# Patient Record
Sex: FEMALE | Race: WHITE | NOT HISPANIC OR LATINO | Employment: UNEMPLOYED | URBAN - METROPOLITAN AREA
[De-identification: names, ages, dates, MRNs, and addresses within clinical notes are randomized per-mention and may not be internally consistent; named-entity substitution may affect disease eponyms.]

---

## 2018-01-09 ENCOUNTER — HOSPITAL ENCOUNTER (EMERGENCY)
Facility: CLINIC | Age: 38
Discharge: HOME | End: 2018-01-09
Attending: EMERGENCY MEDICINE | Admitting: EMERGENCY MEDICINE
Payer: MEDICAID

## 2018-01-09 ENCOUNTER — APPOINTMENT (OUTPATIENT)
Dept: ULTRASOUND IMAGING | Facility: CLINIC | Age: 38
End: 2018-01-09
Attending: EMERGENCY MEDICINE
Payer: MEDICAID

## 2018-01-09 ENCOUNTER — APPOINTMENT (OUTPATIENT)
Dept: CT IMAGING | Facility: CLINIC | Age: 38
End: 2018-01-09
Attending: EMERGENCY MEDICINE
Payer: MEDICAID

## 2018-01-09 VITALS
OXYGEN SATURATION: 100 % | DIASTOLIC BLOOD PRESSURE: 99 MMHG | SYSTOLIC BLOOD PRESSURE: 139 MMHG | WEIGHT: 181.44 LBS | RESPIRATION RATE: 18 BRPM | BODY MASS INDEX: 29.51 KG/M2 | TEMPERATURE: 97 F

## 2018-01-09 DIAGNOSIS — R79.89 ELEVATED LFTS: ICD-10-CM

## 2018-01-09 DIAGNOSIS — R10.84 ABDOMINAL PAIN, GENERALIZED: ICD-10-CM

## 2018-01-09 LAB
ALBUMIN SERPL-MCNC: 3.7 G/DL (ref 3.4–5)
ALBUMIN UR-MCNC: NEGATIVE MG/DL
ALP SERPL-CCNC: 74 U/L (ref 40–150)
ALT SERPL W P-5'-P-CCNC: 147 U/L (ref 0–50)
ANION GAP SERPL CALCULATED.3IONS-SCNC: 7 MMOL/L (ref 3–14)
APPEARANCE UR: ABNORMAL
AST SERPL W P-5'-P-CCNC: 161 U/L (ref 0–45)
BASOPHILS # BLD AUTO: 0 10E9/L (ref 0–0.2)
BASOPHILS NFR BLD AUTO: 0.5 %
BILIRUB SERPL-MCNC: 0.3 MG/DL (ref 0.2–1.3)
BILIRUB UR QL STRIP: NEGATIVE
BUN SERPL-MCNC: 11 MG/DL (ref 7–30)
CALCIUM SERPL-MCNC: 8.5 MG/DL (ref 8.5–10.1)
CHLORIDE SERPL-SCNC: 104 MMOL/L (ref 94–109)
CO2 SERPL-SCNC: 27 MMOL/L (ref 20–32)
COLOR UR AUTO: YELLOW
CREAT SERPL-MCNC: 0.78 MG/DL (ref 0.52–1.04)
DIFFERENTIAL METHOD BLD: ABNORMAL
EOSINOPHIL # BLD AUTO: 0.1 10E9/L (ref 0–0.7)
EOSINOPHIL NFR BLD AUTO: 1.6 %
ERYTHROCYTE [DISTWIDTH] IN BLOOD BY AUTOMATED COUNT: 15.6 % (ref 10–15)
GFR SERPL CREATININE-BSD FRML MDRD: 84 ML/MIN/1.7M2
GLUCOSE SERPL-MCNC: 84 MG/DL (ref 70–99)
GLUCOSE UR STRIP-MCNC: NEGATIVE MG/DL
HCG UR QL: NEGATIVE
HCT VFR BLD AUTO: 46.4 % (ref 35–47)
HGB BLD-MCNC: 14.8 G/DL (ref 11.7–15.7)
HGB UR QL STRIP: NEGATIVE
IMM GRANULOCYTES # BLD: 0 10E9/L (ref 0–0.4)
IMM GRANULOCYTES NFR BLD: 0.2 %
KETONES UR STRIP-MCNC: NEGATIVE MG/DL
LEUKOCYTE ESTERASE UR QL STRIP: NEGATIVE
LIPASE SERPL-CCNC: 169 U/L (ref 73–393)
LYMPHOCYTES # BLD AUTO: 1 10E9/L (ref 0.8–5.3)
LYMPHOCYTES NFR BLD AUTO: 17.2 %
MCH RBC QN AUTO: 28.2 PG (ref 26.5–33)
MCHC RBC AUTO-ENTMCNC: 31.9 G/DL (ref 31.5–36.5)
MCV RBC AUTO: 88 FL (ref 78–100)
MONOCYTES # BLD AUTO: 0.5 10E9/L (ref 0–1.3)
MONOCYTES NFR BLD AUTO: 8.9 %
MUCOUS THREADS #/AREA URNS LPF: PRESENT /LPF
NEUTROPHILS # BLD AUTO: 4.1 10E9/L (ref 1.6–8.3)
NEUTROPHILS NFR BLD AUTO: 71.6 %
NITRATE UR QL: NEGATIVE
NRBC # BLD AUTO: 0 10*3/UL
NRBC BLD AUTO-RTO: 0 /100
PH UR STRIP: 5 PH (ref 5–7)
PLATELET # BLD AUTO: 357 10E9/L (ref 150–450)
POTASSIUM SERPL-SCNC: 3.9 MMOL/L (ref 3.4–5.3)
PROT SERPL-MCNC: 7.6 G/DL (ref 6.8–8.8)
RBC # BLD AUTO: 5.25 10E12/L (ref 3.8–5.2)
RBC #/AREA URNS AUTO: 3 /HPF (ref 0–2)
SODIUM SERPL-SCNC: 138 MMOL/L (ref 133–144)
SOURCE: ABNORMAL
SP GR UR STRIP: 1.02 (ref 1–1.03)
SQUAMOUS #/AREA URNS AUTO: 4 /HPF (ref 0–1)
UROBILINOGEN UR STRIP-MCNC: 2 MG/DL (ref 0–2)
WBC # BLD AUTO: 5.7 10E9/L (ref 4–11)
WBC #/AREA URNS AUTO: 3 /HPF (ref 0–2)

## 2018-01-09 PROCEDURE — 85025 COMPLETE CBC W/AUTO DIFF WBC: CPT | Performed by: EMERGENCY MEDICINE

## 2018-01-09 PROCEDURE — 99285 EMERGENCY DEPT VISIT HI MDM: CPT | Mod: 25

## 2018-01-09 PROCEDURE — 96361 HYDRATE IV INFUSION ADD-ON: CPT

## 2018-01-09 PROCEDURE — 80053 COMPREHEN METABOLIC PANEL: CPT | Performed by: EMERGENCY MEDICINE

## 2018-01-09 PROCEDURE — 96375 TX/PRO/DX INJ NEW DRUG ADDON: CPT

## 2018-01-09 PROCEDURE — 81025 URINE PREGNANCY TEST: CPT | Performed by: EMERGENCY MEDICINE

## 2018-01-09 PROCEDURE — 81001 URINALYSIS AUTO W/SCOPE: CPT | Performed by: EMERGENCY MEDICINE

## 2018-01-09 PROCEDURE — 74177 CT ABD & PELVIS W/CONTRAST: CPT

## 2018-01-09 PROCEDURE — 96374 THER/PROPH/DIAG INJ IV PUSH: CPT | Mod: 59

## 2018-01-09 PROCEDURE — 83690 ASSAY OF LIPASE: CPT | Performed by: EMERGENCY MEDICINE

## 2018-01-09 PROCEDURE — 76705 ECHO EXAM OF ABDOMEN: CPT

## 2018-01-09 PROCEDURE — 25000128 H RX IP 250 OP 636: Performed by: EMERGENCY MEDICINE

## 2018-01-09 PROCEDURE — 96376 TX/PRO/DX INJ SAME DRUG ADON: CPT

## 2018-01-09 RX ORDER — IOPAMIDOL 755 MG/ML
500 INJECTION, SOLUTION INTRAVASCULAR ONCE
Status: COMPLETED | OUTPATIENT
Start: 2018-01-09 | End: 2018-01-09

## 2018-01-09 RX ORDER — OXYCODONE HYDROCHLORIDE 5 MG/1
5 TABLET ORAL EVERY 6 HOURS PRN
Qty: 12 TABLET | Refills: 0 | Status: SHIPPED | OUTPATIENT
Start: 2018-01-09 | End: 2018-10-22

## 2018-01-09 RX ORDER — HYDROMORPHONE HYDROCHLORIDE 1 MG/ML
0.5 INJECTION, SOLUTION INTRAMUSCULAR; INTRAVENOUS; SUBCUTANEOUS ONCE
Status: COMPLETED | OUTPATIENT
Start: 2018-01-09 | End: 2018-01-09

## 2018-01-09 RX ORDER — ONDANSETRON 4 MG/1
4 TABLET, ORALLY DISINTEGRATING ORAL EVERY 8 HOURS PRN
Qty: 10 TABLET | Refills: 0 | Status: SHIPPED | OUTPATIENT
Start: 2018-01-09 | End: 2018-01-12

## 2018-01-09 RX ORDER — ONDANSETRON 2 MG/ML
4 INJECTION INTRAMUSCULAR; INTRAVENOUS ONCE
Status: COMPLETED | OUTPATIENT
Start: 2018-01-09 | End: 2018-01-09

## 2018-01-09 RX ADMIN — Medication 0.5 MG: at 04:30

## 2018-01-09 RX ADMIN — Medication 0.5 MG: at 06:07

## 2018-01-09 RX ADMIN — SODIUM CHLORIDE 63 ML: 9 INJECTION, SOLUTION INTRAVENOUS at 05:19

## 2018-01-09 RX ADMIN — SODIUM CHLORIDE 1000 ML: 9 INJECTION, SOLUTION INTRAVENOUS at 03:36

## 2018-01-09 RX ADMIN — ONDANSETRON 4 MG: 2 INJECTION INTRAMUSCULAR; INTRAVENOUS at 03:35

## 2018-01-09 RX ADMIN — IOPAMIDOL 91 ML: 755 INJECTION, SOLUTION INTRAVENOUS at 05:19

## 2018-01-09 RX ADMIN — Medication 0.5 MG: at 03:35

## 2018-01-09 ASSESSMENT — ENCOUNTER SYMPTOMS
DYSURIA: 0
DIARRHEA: 0
FREQUENCY: 0
ABDOMINAL PAIN: 1
HEMATURIA: 0
CONSTIPATION: 0
FEVER: 0
VOMITING: 1

## 2018-01-09 NOTE — ED PROVIDER NOTES
History     Chief Complaint:  Abdominal pain    HPI   Catarino James is a 37-year-old female who presents for evaluation of constant diffuse abdominal pain.  The patient reports that she woke up yesterday morning at 0830 with this pain.  She had one episode of vomiting after eating a piece of pizza and has not eaten anything since.  The patient has had no fever and has not noticed any aggravating or alleviating factors for her pain.  She states that she has had normal bowel and bladder function.  The patient reports that she recently took a pregnancy test which was negative.  She states that her last normal menstrual period was in November; however, she states that she did start her period 3 days ago and then did not have it and then had again one day ago.  The patient denies any current vaginal bleeding or discharge.    Allergies:  No known drug allergies.      Medications:     Levothyroxine   Wellbutrin   Celexa   Adderall     Past Medical History:    ADD   Anemia   Depression   Hypothyroidism     Past Surgical History:         Family History:    History reviewed. No pertinent family history.     Social History:  Marital Status: Single  Presents to the ED with significant other   Tobacco Use: Never  Alcohol Use: No   PCP: Katalina Muñoz      Review of Systems   Constitutional: Negative for fever.   Gastrointestinal: Positive for abdominal pain and vomiting (x1). Negative for constipation and diarrhea.   Genitourinary: Negative for dysuria, frequency, hematuria and urgency.   All other systems reviewed and are negative.    Physical Exam   First Vitals:  BP: (!) 148/109  Heart Rate: 94  Temp: 97  F (36.1  C)  Resp: 20  Weight: 82.3 kg (181 lb 7 oz)  SpO2: 99 %    Physical Exam  Constitutional: The patient is oriented to person, place, and time. Alert and cooperative.  HENT:   Right Ear: External ear normal.   Left Ear: External ear normal.   Nose: Nose normal.   Mouth/Throat: Uvula is midline,  oropharynx is clear and moist and mucous membranes are normal. No posterior oropharyngeal edema or erythema.   Eyes: Conjunctivae, EOM and lids are normal. Pupils are equal, round, and reactive to light.   Neck: Trachea normal. Normal range of motion. Neck supple.   Cardiovascular: Normal rate, regular rhythm, normal heart sounds, and intact distal pulses.    Pulmonary/Chest: Effort normal and breath sounds equal bilaterally. No crackles or wheezing.   Abdominal: Soft. Mild diffuse tenderness to palpation. No rebound and no guarding.   Musculoskeletal: Normal range of motion.  No extremity tenderness or edema.  Neurological: Alert and Oriented. Strength 5/5 in upper and lower extremities bilaterally. Sensation intact to light touch throughout.  Skin: Skin is dry. No rash noted.          Emergency Department Course   Imaging:  CT Abdomen Pelvis w Contrast  1. No convincing cause of acute pain identified in the abdomen or  pelvis.  2. A 0.6 cm appendicolith is present in the distal appendix, but the  appendix is otherwise unremarkable and there is therefore no  convincing evidence of acute appendicitis.  Report per radiology: Rodrigo Donahue MD (01/09/18 06:05:04)    Abdomen US, Limited (RUQ)  1. Unremarkable appearance of the gallbladder.  2. No biliary dilatation.  3. Mild diffuse fatty infiltration of the liver.  Report per radiology: Rodrigo Donahue MD (01/09/18 06:56:51)    Radiographic findings were communicated with the patient who voiced understanding of the findings.    Laboratory:  Blood:  CMP: , , otherwise WNL (Creatinine 0.78)   CBC:  WBC 5.7, HGB 14.8,    Lipase: 169    Urine:  UA: Slightly cloudy yellow urine, squamous epithelials 4, WBC 3, RBC 3, Mucous present, otherwise WNL   HCG Qualitative Pregnancy: Negative.     Interventions:  (0335) Zofran, 4 mg, IV injection   (0335) Dilaudid, 0.5 mg, IV injection   (0336) Normal Saline, 1 liter, IV bolus   (0430) Dilaudid,  0.5 mg, IV injection     Emergency Department Course:  Nursing notes and vitals reviewed.    (0319) I entered the room with my scribe, obtained the history, and performed an exam of the patient as documented above.    A peripheral IV was established. Blood was drawn from the patient. This was sent for laboratory testing, findings above.      Urine sample was obtained and sent for laboratory analysis, findings above.     The patient received the interventions above.     The patient was sent for a CT of the abdomen and pelvis as well as an abdominal ultrasound while in the emergency department, findings above.      Findings and plan explained to the Patient. Patient discharged home with instructions regarding supportive care, medications, and reasons to return. The importance of close follow-up was reviewed.        Impression & Plan      Medical Decision Making:  Catarino James is a 37-year-old female who presents for evaluation of constant diffuse abdominal pain.  Upon presentation in the ED, the patient is nontoxic-appearing and vitals are within normal limits and stable.  On exam, she is well-appearing.  She is alert, oriented, and neurologic exam is nonfocal.  Cardiopulmonary exam is unremarkable.  On abdominal examination, she does endorse mild diffuse tenderness with palpation.  There is no rebound or guarding.  The rest of her exam is as mentioned above.    Labs were obtained and are as mentioned above.  Notably, she does not have a leukocytosis.  LFTs are mildly elevated.  Therefore, a right upper quadrant ultrasound was obtained and demonstrates mild diffuse fatty infiltration of the liver, but the gallbladder is unremarkable.  There is no biliary dilatation.  Given that the patient continues to endorse significant pain, CT of the abdomen was obtained and demonstrates no acute abnormality.  Per radiology, there is a 0.6 cm appendicolith in the distal appendix, but the appendix is otherwise unremarkable and  there is no convincing evidence to suggest an acute appendicitis.  These results were discussed with the patient and she notes understanding.  Upon my repeat evaluation, the patient does note improvement in her symptoms.  Overall, the patient is well-appearing with a relatively unremarkable workup here.  Therefore, I do feel she is safe for discharge to home.  I did discuss with the patient, that I recommend close follow-up with her primary care physician for follow up and close monitoring of her LFTs. She notes understanding and agreement with this plan.  Return instructions were given.  She was stable/improved at the time of discharge.    Diagnosis:    ICD-10-CM    1. Abdominal pain, generalized R10.84 Lipase   2. Elevated LFTs R79.89        Disposition:  discharged to home    [facility] EMERGENCY DEPARTMENT      Scribe disclosure:  GREYSON, Liam Whittaker, am serving as a scribe on 1/9/2018 at 3:19 AM to personally document services performed by Bertha Sosa MD based on my observations and the provider's statements to me.                Bertha Sosa MD  01/12/18 0043

## 2018-01-09 NOTE — DISCHARGE INSTRUCTIONS
Please follow up closely with your regular physician.     Please return to the ED if your symptoms worsen or if you develop new or concerning symptoms.     Discharge Instructions  Abdominal Pain    Abdominal pain can be caused by many things. Your evaluation today does not show the exact cause for your pain. Your doctor today has decided that it is unlikely your pain is due to a life threatening problem, or a problem requiring surgery or hospital admission. Sometimes those problems cannot be found right away, so it is very important that you follow up as directed.  Sometimes only the changes which occur over time allow the cause of your pain to be found.    Return to the Emergency Department for a recheck in 8-12 hours if your pain continues.  If your pain gets worse, changes in location, or feels different, return to the Emergency Department right away.    ADULTS:  Return to the Emergency Department right away if:      You get an oral temperature above 102oF or as directed by your doctor.    You have blood in your stools (bright red or black, tarry stools).    You keep throwing up or can t drink liquids.    You see blood when you throw up.    You can t have a bowel movement or you can t pass gas.    Your stomach gets bloated or bigger.    Your skin or the whites of your eyes look yellow.    You faint.    You have bloody, frequent or painful urination.    You have new symptoms or anything that worries you.    CHILDREN:  Return to the Emergency Department right away if your child has any of the above-listed symptoms or the following:      Pushes your hand away or screams/cries when his/her belly is touched.    You notice your child is very fussy or weak.    Your child is very tired and is too tired to eat or drink.    Your child is dehydrated.  Signs of dehydration can be:  o Your infant has had no wet diapers in 4-5 hours.  o Your older child has not passed urine in 6-8 hours.  o Your infant or child starts to have  dry mouth and lips, or no saliva or tears.    PREGNANT WOMEN:  Return to the Emergency Department right away if you have any of the above-listed symptoms or the following:      You have bleeding, leaking fluid or passing tissue from the vagina.    You have worse pain or cramping, or pain in your shoulder or back.    You have vomiting that will not stop.    You have painful or bloody urination.    You have a temperature of 100oF or more.    Your baby is not moving as much as usual.    You faint.    You get a bad headache with or without eye problems and abdominal pain.    You have a convulsion or seizure.    You have unusual discharge from your vagina and abdominal pain.    Abdominal pain is pretty common during pregnancy.  Your pain may or may not be related to your pregnancy. You should follow-up closely with your OB doctor so they can evaluate you and your baby.  Until you follow-up with your regular doctor, do the following:       Avoid sex and do not put anything in your vagina.    Drink clear fluids.    Only take medications approved by your doctor.    MORE INFORMATION:    Appendicitis:  A possible cause of abdominal pain in any person who still has their appendix is acute appendicitis. Appendicitis is often hard to diagnose.  Testing does not always rule out early appendicitis or other causes of abdominal pain. Close follow-up with your doctor and re-evaluations may be needed to figure out the reason for your abdominal pain.    Follow-up:  It is very important that you make an appointment with your clinic and go to the appointment.  If you do not follow-up with your primary doctor, it may result in missing an important development which could result in permanent injury or disability and/or lasting pain.  If there is any problem keeping your appointment, call your doctor or return to the Emergency Department.    Medications:  Take your medications as directed by your doctor today.  Before using over-the-counter  "medications, ask your doctor and make sure to take the medications as directed.  If you have any questions about medications, ask your doctor.    Diet:  Resume your normal diet as much as possible, but do not eat fried, fatty or spicy foods while you have pain.  Do not drink alcohol or have caffeine.  Do not smoke tobacco.    Probiotics: If you have been given an antibiotic, you may want to also take a probiotic pill or eat yogurt with live cultures. Probiotics have \"good bacteria\" to help your intestines stay healthy. Studies have shown that probiotics help prevent diarrhea and other intestine problems (including C. diff infection) when you take antibiotics. You can buy these without a prescription in the pharmacy section of the store.     If you were given a prescription for medicine here today, be sure to read all of the information (including the package insert) that comes with your prescription.  This will include important information about the medicine, its side effects, and any warnings that you need to know about.  The pharmacist who fills the prescription can provide more information and answer questions you may have about the medicine.  If you have questions or concerns that the pharmacist cannot address, please call or return to the Emergency Department.         Opioid Medication Information    Pain medications are among the most commonly prescribed medicines, so we are including this information for all our patients. If you did not receive pain medication or get a prescription for pain medicine, you can ignore it.     You may have been given a prescription for an opioid (narcotic) pain medicine and/or have received a pain medicine while here in the Emergency Department. These medicines can make you drowsy or impaired. You must not drive, operate dangerous equipment, or engage in any other dangerous activities while taking these medications. If you drive while taking these medications, you could be " arrested for DUI, or driving under the influence. Do not drink any alcohol while you are taking these medications.     Opioid pain medications can cause addiction. If you have a history of chemical dependency of any type, you are at a higher risk of becoming addicted to pain medications.  Only take these prescribed medications to treat your pain when all other options have been tried. Take it for as short a time and as few doses as possible. Store your pain pills in a secure place, as they are frequently stolen and provide a dangerous opportunity for children or visitors in your house to start abusing these powerful medications. We will not replace any lost or stolen medicine.  As soon as your pain is better, you should flush all your remaining medication.     Many prescription pain medications contain Tylenol  (acetaminophen), including Vicodin , Tylenol #3 , Norco , Lortab , and Percocet .  You should not take any extra pills of Tylenol  if you are using these prescription medications or you can get very sick.  Do not ever take more than 3000 mg of acetaminophen in any 24 hour period.    All opioids tend to cause constipation. Drink plenty of water and eat foods that have a lot of fiber, such as fruits, vegetables, prune juice, apple juice and high fiber cereal.  Take a laxative if you don t move your bowels at least every other day. Miralax , Milk of Magnesia, Colace , or Senna  can be used to keep you regular.      Remember that you can always come back to the Emergency Department if you are not able to see your regular doctor in the amount of time listed above, if you get any new symptoms, or if there is anything that worries you.

## 2018-01-09 NOTE — ED AVS SNAPSHOT
[facility] Emergency Department    [address] [place] [address]    Phone:  [phone]    Fax:  [phone]                                       Catarino James   MRN: [id number]    Department:  [facility] Emergency Department   Date of Visit:  1/9/2018           Patient Information     Date Of Birth          1980        Your diagnoses for this visit were:     Abdominal pain, generalized     Elevated LFTs        You were seen by Bertha Sosa MD.      Follow-up Information     Follow up with Katalina Muñoz MD In 3 days.    Specialty:  Family Practice    Contact information:    [facility] [place]  [address] [place] [address]  [phone]          Discharge Instructions       Please follow up closely with your regular physician.     Please return to the ED if your symptoms worsen or if you develop new or concerning symptoms.     Discharge Instructions  Abdominal Pain    Abdominal pain can be caused by many things. Your evaluation today does not show the exact cause for your pain. Your doctor today has decided that it is unlikely your pain is due to a life threatening problem, or a problem requiring surgery or hospital admission. Sometimes those problems cannot be found right away, so it is very important that you follow up as directed.  Sometimes only the changes which occur over time allow the cause of your pain to be found.    Return to the Emergency Department for a recheck in 8-12 hours if your pain continues.  If your pain gets worse, changes in location, or feels different, return to the Emergency Department right away.    ADULTS:  Return to the Emergency Department right away if:      You get an oral temperature above 102oF or as directed by your doctor.    You have blood in your stools (bright red or black, tarry stools).    You keep throwing up or can t drink liquids.    You see blood when you throw up.    You can t have a  bowel movement or you can t pass gas.    Your stomach gets bloated or bigger.    Your skin or the whites of your eyes look yellow.    You faint.    You have bloody, frequent or painful urination.    You have new symptoms or anything that worries you.    CHILDREN:  Return to the Emergency Department right away if your child has any of the above-listed symptoms or the following:      Pushes your hand away or screams/cries when his/her belly is touched.    You notice your child is very fussy or weak.    Your child is very tired and is too tired to eat or drink.    Your child is dehydrated.  Signs of dehydration can be:  o Your infant has had no wet diapers in 4-5 hours.  o Your older child has not passed urine in 6-8 hours.  o Your infant or child starts to have dry mouth and lips, or no saliva or tears.    PREGNANT WOMEN:  Return to the Emergency Department right away if you have any of the above-listed symptoms or the following:      You have bleeding, leaking fluid or passing tissue from the vagina.    You have worse pain or cramping, or pain in your shoulder or back.    You have vomiting that will not stop.    You have painful or bloody urination.    You have a temperature of 100oF or more.    Your baby is not moving as much as usual.    You faint.    You get a bad headache with or without eye problems and abdominal pain.    You have a convulsion or seizure.    You have unusual discharge from your vagina and abdominal pain.    Abdominal pain is pretty common during pregnancy.  Your pain may or may not be related to your pregnancy. You should follow-up closely with your OB doctor so they can evaluate you and your baby.  Until you follow-up with your regular doctor, do the following:       Avoid sex and do not put anything in your vagina.    Drink clear fluids.    Only take medications approved by your doctor.    MORE INFORMATION:    Appendicitis:  A possible cause of abdominal pain in any person who still has their  "appendix is acute appendicitis. Appendicitis is often hard to diagnose.  Testing does not always rule out early appendicitis or other causes of abdominal pain. Close follow-up with your doctor and re-evaluations may be needed to figure out the reason for your abdominal pain.    Follow-up:  It is very important that you make an appointment with your clinic and go to the appointment.  If you do not follow-up with your primary doctor, it may result in missing an important development which could result in permanent injury or disability and/or lasting pain.  If there is any problem keeping your appointment, call your doctor or return to the Emergency Department.    Medications:  Take your medications as directed by your doctor today.  Before using over-the-counter medications, ask your doctor and make sure to take the medications as directed.  If you have any questions about medications, ask your doctor.    Diet:  Resume your normal diet as much as possible, but do not eat fried, fatty or spicy foods while you have pain.  Do not drink alcohol or have caffeine.  Do not smoke tobacco.    Probiotics: If you have been given an antibiotic, you may want to also take a probiotic pill or eat yogurt with live cultures. Probiotics have \"good bacteria\" to help your intestines stay healthy. Studies have shown that probiotics help prevent diarrhea and other intestine problems (including C. diff infection) when you take antibiotics. You can buy these without a prescription in the pharmacy section of the store.     If you were given a prescription for medicine here today, be sure to read all of the information (including the package insert) that comes with your prescription.  This will include important information about the medicine, its side effects, and any warnings that you need to know about.  The pharmacist who fills the prescription can provide more information and answer questions you may have about the medicine.  If you have " questions or concerns that the pharmacist cannot address, please call or return to the Emergency Department.         Opioid Medication Information    Pain medications are among the most commonly prescribed medicines, so we are including this information for all our patients. If you did not receive pain medication or get a prescription for pain medicine, you can ignore it.     You may have been given a prescription for an opioid (narcotic) pain medicine and/or have received a pain medicine while here in the Emergency Department. These medicines can make you drowsy or impaired. You must not drive, operate dangerous equipment, or engage in any other dangerous activities while taking these medications. If you drive while taking these medications, you could be arrested for DUI, or driving under the influence. Do not drink any alcohol while you are taking these medications.     Opioid pain medications can cause addiction. If you have a history of chemical dependency of any type, you are at a higher risk of becoming addicted to pain medications.  Only take these prescribed medications to treat your pain when all other options have been tried. Take it for as short a time and as few doses as possible. Store your pain pills in a secure place, as they are frequently stolen and provide a dangerous opportunity for children or visitors in your house to start abusing these powerful medications. We will not replace any lost or stolen medicine.  As soon as your pain is better, you should flush all your remaining medication.     Many prescription pain medications contain Tylenol  (acetaminophen), including Vicodin , Tylenol #3 , Norco , Lortab , and Percocet .  You should not take any extra pills of Tylenol  if you are using these prescription medications or you can get very sick.  Do not ever take more than 3000 mg of acetaminophen in any 24 hour period.    All opioids tend to cause constipation. Drink plenty of water and eat foods  that have a lot of fiber, such as fruits, vegetables, prune juice, apple juice and high fiber cereal.  Take a laxative if you don t move your bowels at least every other day. Miralax , Milk of Magnesia, Colace , or Senna  can be used to keep you regular.      Remember that you can always come back to the Emergency Department if you are not able to see your regular doctor in the amount of time listed above, if you get any new symptoms, or if there is anything that worries you.          24 Hour Appointment Hotline       To make an appointment at any [facility], call [phone] ([phone]). If you don't have a family doctor or clinic, we will help you find one. [facility] clinics are conveniently located to serve the needs of you and your family.             Review of your medicines      START taking        Dose / Directions Last dose taken    ondansetron 4 MG ODT tab   Commonly known as:  ZOFRAN ODT   Dose:  4 mg   Quantity:  10 tablet        Take 1 tablet (4 mg) by mouth every 8 hours as needed   Refills:  0        oxyCODONE IR 5 MG tablet   Commonly known as:  ROXICODONE   Dose:  5 mg   Quantity:  12 tablet        Take 1 tablet (5 mg) by mouth every 6 hours as needed for pain   Refills:  0          Our records show that you are taking the medicines listed below. If these are incorrect, please call your family doctor or clinic.        Dose / Directions Last dose taken    ADDERALL 20 MG per tablet   Quantity:  90   Generic drug:  amphetamine-dextroamphetamine        1 tablet po tid   Refills:  0        albuterol 108 (90 BASE) MCG/ACT Inhaler   Commonly known as:  PROAIR HFA/PROVENTIL HFA/VENTOLIN HFA   Dose:  2 puff   Quantity:  1 Inhaler        Inhale 2 puffs into the lungs every 4 hours as needed (PRN cough)   Refills:  0        celeXA 40 MG tablet   Quantity:  90   Generic drug:  citalopram        1 TABLET DAILY   Refills:  1        LEVOTHROID PO        Refills:  0        WELLBUTRIN PO        Refills:   0                Prescriptions were sent or printed at these locations (2 Prescriptions)                   Other Prescriptions                Printed at Department/Unit printer (2 of 2)         oxyCODONE IR (ROXICODONE) 5 MG tablet               ondansetron (ZOFRAN ODT) 4 MG ODT tab                Procedures and tests performed during your visit     Abdomen US, limited (RUQ only)    CBC + differential    CT Abdomen Pelvis w Contrast    Comprehensive metabolic panel    HCG qualitative urine    Lipase    UA with Microscopic reflex to Culture      Orders Needing Specimen Collection     None      Pending Results     No orders found from 1/7/2018 to 1/10/2018.            Pending Culture Results     No orders found from 1/7/2018 to 1/10/2018.            Pending Results Instructions     If you had any lab results that were not finalized at the time of your Discharge, you can call the ED Lab Result RN at [phone]. You will be contacted by this team for any positive Lab results or changes in treatment. The nurses are available 7 days a week from 10A to 6:30P.  You can leave a message 24 hours per day and they will return your call.        Test Results From Your Hospital Stay        1/9/2018  1:56 AM      Component Results     Component Value Ref Range & Units Status    Color Urine Yellow  Final    Appearance Urine Slightly Cloudy  Final    Glucose Urine Negative NEG^Negative mg/dL Final    Bilirubin Urine Negative NEG^Negative Final    Ketones Urine Negative NEG^Negative mg/dL Final    Specific Gravity Urine 1.020 1.003 - 1.035 Final    Blood Urine Negative NEG^Negative Final    pH Urine 5.0 5.0 - 7.0 pH Final    Protein Albumin Urine Negative NEG^Negative mg/dL Final    Urobilinogen mg/dL 2.0 0.0 - 2.0 mg/dL Final    Nitrite Urine Negative NEG^Negative Final    Leukocyte Esterase Urine Negative NEG^Negative Final    Source Unspecified Urine  Final    WBC Urine 3 (H) 0 - 2 /HPF Final    RBC Urine 3 (H) 0 - 2 /HPF Final     Squamous Epithelial /HPF Urine 4 (H) 0 - 1 /HPF Final    Mucous Urine Present (A) NEG^Negative /LPF Final         1/9/2018  1:57 AM      Component Results     Component Value Ref Range & Units Status    HCG Qual Urine Negative NEG^Negative Final    This test is for screening purposes.  Results should be interpreted along with   the clinical picture.  Confirmation testing is available if warranted by   ordering TZJ341, HCG Quantitative Pregnancy.           1/9/2018  3:39 AM      Component Results     Component Value Ref Range & Units Status    Sodium 138 133 - 144 mmol/L Final    Potassium 3.9 3.4 - 5.3 mmol/L Final    Chloride 104 94 - 109 mmol/L Final    Carbon Dioxide 27 20 - 32 mmol/L Final    Anion Gap 7 3 - 14 mmol/L Final    Glucose 84 70 - 99 mg/dL Final    Urea Nitrogen 11 7 - 30 mg/dL Final    Creatinine 0.78 0.52 - 1.04 mg/dL Final    GFR Estimate 84 >60 mL/min/1.7m2 Final    Non  GFR Calc    GFR Estimate If Black >90 >60 mL/min/1.7m2 Final    African American GFR Calc    Calcium 8.5 8.5 - 10.1 mg/dL Final    Bilirubin Total 0.3 0.2 - 1.3 mg/dL Final    Albumin 3.7 3.4 - 5.0 g/dL Final    Protein Total 7.6 6.8 - 8.8 g/dL Final    Alkaline Phosphatase 74 40 - 150 U/L Final     (H) 0 - 50 U/L Final     (H) 0 - 45 U/L Final         1/9/2018  3:33 AM      Component Results     Component Value Ref Range & Units Status    WBC 5.7 4.0 - 11.0 10e9/L Final    RBC Count 5.25 (H) 3.8 - 5.2 10e12/L Final    Hemoglobin 14.8 11.7 - 15.7 g/dL Final    Hematocrit 46.4 35.0 - 47.0 % Final    MCV 88 78 - 100 fl Final    MCH 28.2 26.5 - 33.0 pg Final    MCHC 31.9 31.5 - 36.5 g/dL Final    RDW 15.6 (H) 10.0 - 15.0 % Final    Platelet Count 357 150 - 450 10e9/L Final    Diff Method Automated Method  Final    % Neutrophils 71.6 % Final    % Lymphocytes 17.2 % Final    % Monocytes 8.9 % Final    % Eosinophils 1.6 % Final    % Basophils 0.5 % Final    % Immature Granulocytes 0.2 % Final     Nucleated RBCs 0 0 /100 Final    Absolute Neutrophil 4.1 1.6 - 8.3 10e9/L Final    Absolute Lymphocytes 1.0 0.8 - 5.3 10e9/L Final    Absolute Monocytes 0.5 0.0 - 1.3 10e9/L Final    Absolute Eosinophils 0.1 0.0 - 0.7 10e9/L Final    Absolute Basophils 0.0 0.0 - 0.2 10e9/L Final    Abs Immature Granulocytes 0.0 0 - 0.4 10e9/L Final    Absolute Nucleated RBC 0.0  Final         1/9/2018  6:06 AM      Narrative     CT ABDOMEN AND PELVIS WITH CONTRAST  1/9/2018 5:31 AM     HISTORY: Diffuse abdominal pain. Dry heaving.    COMPARISON: None.    TECHNIQUE: Following the uneventful administration of 91 mL Isovue-370  intravenous contrast, helical sections were acquired from the top of  the diaphragm through the pubic symphysis. Coronal reconstructions  were generated. Radiation dose for this scan was reduced using  automated exposure control, adjustment of the mA and/or kV according  to the patient's size, or iterative reconstruction technique.    FINDINGS:  Abdomen: Mild diffuse fatty infiltration of the liver. The spleen,  pancreas, adrenal glands and kidneys are unremarkable. The gallbladder  is present. No enlarged lymph nodes or free fluid in the upper  abdomen.    Scan through the lower chest is significant for a small calcified  granuloma in the left lung base.    Pelvis: The small and large bowel are normal in caliber. A 0.6 cm  appendicolith is present in the distal appendix. The appendix is  otherwise unremarkable. No bowel wall thickening, pneumatosis or free  intraperitoneal gas. The uterus is present. No enlarged lymph nodes or  free fluid in the pelvis.        Impression     IMPRESSION:  1. No convincing cause of acute pain identified in the abdomen or  pelvis.  2. A 0.6 cm appendicolith is present in the distal appendix, but the  appendix is otherwise unremarkable and there is therefore no  convincing evidence of acute appendicitis.    HELDER PAIZ MD         1/9/2018  6:57 AM      Narrative     ULTRASOUND  ABDOMEN LIMITED RIGHT UPPER QUADRANT  1/9/2018 6:28 AM     HISTORY: Abnormal liver function tests. Right upper quadrant pain.    COMPARISON: 1/9/2018 - CT abdomen and pelvis.    FINDINGS: Mild diffuse increased hepatic echogenicity, likely  representing fatty infiltration. No hepatic masses. The gallbladder is  unremarkable without gallstones, wall thickening or pericholecystic  fluid. No focal tenderness over the gallbladder. No intra- or  extrahepatic biliary dilatation. The common duct measures 0.5 cm in  diameter. The right kidney has normal size and echogenicity, measuring  9.2 cm in length. No right intrarenal collecting system dilatation,  calculi or masses. No free fluid in the upper right hemiabdomen.        Impression     IMPRESSION:  1. Unremarkable appearance of the gallbladder.  2. No biliary dilatation.  3. Mild diffuse fatty infiltration of the liver.    HELDER PAIZ MD         1/9/2018  7:06 AM      Component Results     Component Value Ref Range & Units Status    Lipase 169 73 - 393 U/L Final                Clinical Quality Measure: Blood Pressure Screening     Your blood pressure was checked while you were in the emergency department today. The last reading we obtained was  BP: (!) 139/99 . Please read the guidelines below about what these numbers mean and what you should do about them.  If your systolic blood pressure (the top number) is less than 120 and your diastolic blood pressure (the bottom number) is less than 80, then your blood pressure is normal. There is nothing more that you need to do about it.  If your systolic blood pressure (the top number) is 120-139 or your diastolic blood pressure (the bottom number) is 80-89, your blood pressure may be higher than it should be. You should have your blood pressure rechecked within a year by a primary care provider.  If your systolic blood pressure (the top number) is 140 or greater or your diastolic blood pressure (the bottom number) is 90 or  "greater, you may have high blood pressure. High blood pressure is treatable, but if left untreated over time it can put you at risk for heart attack, stroke, or kidney failure. You should have your blood pressure rechecked by a primary care provider within the next 4 weeks.  If your provider in the emergency department today gave you specific instructions to follow-up with your doctor or provider even sooner than that, you should follow that instruction and not wait for up to 4 weeks for your follow-up visit.        Thank you for choosing [facility]       Thank you for choosing [facility] for your care. Our goal is always to provide you with excellent care. Hearing back from our patients is one way we can continue to improve our services. Please take a few minutes to complete the written survey that you may receive in the mail after you visit with us. Thank you!        [employer] Information     [employer] lets you send messages to your doctor, view your test results, renew your prescriptions, schedule appointments and more. To sign up, go to [email or web address] . Click on \"Log in\" on the left side of the screen, which will take you to the Welcome page. Then click on \"Sign up Now\" on the right side of the page.     You will be asked to enter the access code listed below, as well as some personal information. Please follow the directions to create your username and password.     Your access code is: [email or web address]  Expires: 2018  7:58 AM     Your access code will  in 90 days. If you need help or a new code, please call your [facility] clinic or [phone].        Care EveryWhere ID     This is your Care EveryWhere ID. This could be used by other organizations to access your [facility] medical records  [id number]        Equal Access to Services     DAVID RILEY : Jane Burr, delta nayak, shwetha garcia. So [facility] [phone].    ATENCIÓN: Si " habla bria, tiene a fish disposición servicios gratuitos de asistencia lingüística. Llame al [phone].    We comply with applicable federal civil rights laws and [place] laws. We do not discriminate on the basis of race, color, national origin, age, disability, sex, sexual orientation, or gender identity.            After Visit Summary       This is your record. Keep this with you and show to your community pharmacist(s) and doctor(s) at your next visit.

## 2018-01-09 NOTE — ED AVS SNAPSHOT
[facility] Emergency Department    [address]    [facility] [address]    Phone:  [phone]    Fax:  [phone]                                       Catarino James   MRN: [id number]    Department:  [facility] Emergency Department   Date of Visit:  1/9/2018           After Visit Summary Signature Page     I have received my discharge instructions, and my questions have been answered. I have discussed any challenges I see with this plan with the nurse or doctor.    ..........................................................................................................................................  Patient/Patient Representative Signature      ..........................................................................................................................................  Patient Representative Print Name and Relationship to Patient    ..................................................               ................................................  Date                                            Time    ..........................................................................................................................................  Reviewed by Signature/Title    ...................................................              ..............................................  Date                                                            Time

## 2018-01-10 ENCOUNTER — HOSPITAL ENCOUNTER (EMERGENCY)
Facility: CLINIC | Age: 38
Discharge: HOME | End: 2018-01-10
Attending: EMERGENCY MEDICINE | Admitting: EMERGENCY MEDICINE
Payer: MEDICAID

## 2018-01-10 ENCOUNTER — APPOINTMENT (OUTPATIENT)
Dept: ULTRASOUND IMAGING | Facility: CLINIC | Age: 38
End: 2018-01-10
Attending: EMERGENCY MEDICINE
Payer: MEDICAID

## 2018-01-10 VITALS
BODY MASS INDEX: 28.25 KG/M2 | RESPIRATION RATE: 16 BRPM | HEART RATE: 65 BPM | DIASTOLIC BLOOD PRESSURE: 76 MMHG | SYSTOLIC BLOOD PRESSURE: 122 MMHG | HEIGHT: 67 IN | TEMPERATURE: 98.4 F | WEIGHT: 180 LBS | OXYGEN SATURATION: 98 %

## 2018-01-10 DIAGNOSIS — K29.00 ACUTE GASTRITIS WITHOUT HEMORRHAGE, UNSPECIFIED GASTRITIS TYPE: ICD-10-CM

## 2018-01-10 DIAGNOSIS — R10.13 ABDOMINAL PAIN, EPIGASTRIC: ICD-10-CM

## 2018-01-10 DIAGNOSIS — K21.9 GASTROESOPHAGEAL REFLUX DISEASE WITHOUT ESOPHAGITIS: ICD-10-CM

## 2018-01-10 LAB
ALBUMIN SERPL-MCNC: 3.3 G/DL (ref 3.4–5)
ALP SERPL-CCNC: 64 U/L (ref 40–150)
ALT SERPL W P-5'-P-CCNC: 108 U/L (ref 0–50)
ANION GAP SERPL CALCULATED.3IONS-SCNC: 3 MMOL/L (ref 3–14)
AST SERPL W P-5'-P-CCNC: 76 U/L (ref 0–45)
BASOPHILS # BLD AUTO: 0 10E9/L (ref 0–0.2)
BASOPHILS NFR BLD AUTO: 0.6 %
BILIRUB SERPL-MCNC: 0.3 MG/DL (ref 0.2–1.3)
BUN SERPL-MCNC: 8 MG/DL (ref 7–30)
CALCIUM SERPL-MCNC: 8.3 MG/DL (ref 8.5–10.1)
CHLORIDE SERPL-SCNC: 102 MMOL/L (ref 94–109)
CO2 SERPL-SCNC: 30 MMOL/L (ref 20–32)
CREAT SERPL-MCNC: 0.74 MG/DL (ref 0.52–1.04)
DIFFERENTIAL METHOD BLD: ABNORMAL
EOSINOPHIL # BLD AUTO: 0.1 10E9/L (ref 0–0.7)
EOSINOPHIL NFR BLD AUTO: 1.5 %
ERYTHROCYTE [DISTWIDTH] IN BLOOD BY AUTOMATED COUNT: 15.4 % (ref 10–15)
GFR SERPL CREATININE-BSD FRML MDRD: 89 ML/MIN/1.7M2
GLUCOSE SERPL-MCNC: 105 MG/DL (ref 70–99)
HCT VFR BLD AUTO: 43.7 % (ref 35–47)
HGB BLD-MCNC: 14 G/DL (ref 11.7–15.7)
IMM GRANULOCYTES # BLD: 0 10E9/L (ref 0–0.4)
IMM GRANULOCYTES NFR BLD: 0.2 %
LIPASE SERPL-CCNC: 138 U/L (ref 73–393)
LYMPHOCYTES # BLD AUTO: 1.1 10E9/L (ref 0.8–5.3)
LYMPHOCYTES NFR BLD AUTO: 21.5 %
MCH RBC QN AUTO: 28.7 PG (ref 26.5–33)
MCHC RBC AUTO-ENTMCNC: 32 G/DL (ref 31.5–36.5)
MCV RBC AUTO: 90 FL (ref 78–100)
MONOCYTES # BLD AUTO: 0.6 10E9/L (ref 0–1.3)
MONOCYTES NFR BLD AUTO: 12.1 %
NEUTROPHILS # BLD AUTO: 3.3 10E9/L (ref 1.6–8.3)
NEUTROPHILS NFR BLD AUTO: 64.1 %
NRBC # BLD AUTO: 0 10*3/UL
NRBC BLD AUTO-RTO: 0 /100
PLATELET # BLD AUTO: 342 10E9/L (ref 150–450)
POTASSIUM SERPL-SCNC: 3.7 MMOL/L (ref 3.4–5.3)
PROT SERPL-MCNC: 6.7 G/DL (ref 6.8–8.8)
RBC # BLD AUTO: 4.88 10E12/L (ref 3.8–5.2)
SODIUM SERPL-SCNC: 135 MMOL/L (ref 133–144)
WBC # BLD AUTO: 5.2 10E9/L (ref 4–11)

## 2018-01-10 PROCEDURE — 99285 EMERGENCY DEPT VISIT HI MDM: CPT | Mod: 25

## 2018-01-10 PROCEDURE — 25000125 ZZHC RX 250: Performed by: EMERGENCY MEDICINE

## 2018-01-10 PROCEDURE — 80053 COMPREHEN METABOLIC PANEL: CPT | Performed by: EMERGENCY MEDICINE

## 2018-01-10 PROCEDURE — 83690 ASSAY OF LIPASE: CPT | Performed by: EMERGENCY MEDICINE

## 2018-01-10 PROCEDURE — 25000132 ZZH RX MED GY IP 250 OP 250 PS 637: Performed by: EMERGENCY MEDICINE

## 2018-01-10 PROCEDURE — 96374 THER/PROPH/DIAG INJ IV PUSH: CPT

## 2018-01-10 PROCEDURE — 85025 COMPLETE CBC W/AUTO DIFF WBC: CPT | Performed by: EMERGENCY MEDICINE

## 2018-01-10 PROCEDURE — 76705 ECHO EXAM OF ABDOMEN: CPT

## 2018-01-10 PROCEDURE — 25000128 H RX IP 250 OP 636: Performed by: EMERGENCY MEDICINE

## 2018-01-10 RX ORDER — ALUMINA, MAGNESIA, AND SIMETHICONE 2400; 2400; 240 MG/30ML; MG/30ML; MG/30ML
30 SUSPENSION ORAL EVERY 4 HOURS PRN
Qty: 355 ML | Refills: 0 | Status: SHIPPED | OUTPATIENT
Start: 2018-01-10 | End: 2018-10-22

## 2018-01-10 RX ORDER — FAMOTIDINE 20 MG/1
20 TABLET, FILM COATED ORAL 2 TIMES DAILY
Qty: 60 TABLET | Refills: 0 | Status: SHIPPED | OUTPATIENT
Start: 2018-01-10 | End: 2018-10-22

## 2018-01-10 RX ORDER — KETOROLAC TROMETHAMINE 15 MG/ML
15 INJECTION, SOLUTION INTRAMUSCULAR; INTRAVENOUS ONCE
Status: COMPLETED | OUTPATIENT
Start: 2018-01-10 | End: 2018-01-10

## 2018-01-10 RX ADMIN — KETOROLAC TROMETHAMINE 15 MG: 15 INJECTION, SOLUTION INTRAMUSCULAR; INTRAVENOUS at 19:35

## 2018-01-10 RX ADMIN — LIDOCAINE HYDROCHLORIDE 30 ML: 20 SOLUTION ORAL; TOPICAL at 21:49

## 2018-01-10 ASSESSMENT — ENCOUNTER SYMPTOMS
BLOOD IN STOOL: 0
VOMITING: 0
CONSTIPATION: 0
NAUSEA: 0
ABDOMINAL PAIN: 1
FLANK PAIN: 0
FEVER: 0
DIFFICULTY URINATING: 0
DYSURIA: 0
DIARRHEA: 0
HEMATURIA: 0

## 2018-01-10 NOTE — ED AVS SNAPSHOT
[facility] Emergency Department    [address]    [facility] [address]    Phone:  [phone]    Fax:  [phone]                                       Catarino James   MRN: [id number]    Department:  [facility] Emergency Department   Date of Visit:  1/10/2018           Patient Information     Date Of Birth          1980        Your diagnoses for this visit were:     Acute gastritis without hemorrhage, unspecified gastritis type     Abdominal pain, epigastric     Gastroesophageal reflux disease without esophagitis        You were seen by Yessenia Campo MD.      Follow-up Information     Follow up with [facility]. Schedule an appointment as soon as possible for a visit in 2 days.    Contact information:    [address]  [facility] [address] [phone]        Discharge Instructions       Return to the ED if you are unable to tolerate fluids, intractable nausea or vomiting, severe abdominal pain, fevers >101 or other acute changes.  Please follow up with your PCP in 2-3 days.      Discharge Instructions  Abdominal Pain    Abdominal pain (belly pain) can be caused by many things. Your evaluation today does not show the exact cause for your pain. Your provider today has decided that it is unlikely your pain is due to a life threatening problem, or a problem requiring surgery or hospital admission. Sometimes those problems cannot be found right away, so it is very important that you follow up as directed.  Sometimes only the changes which occur over time allow the cause of your pain to be found.    Generally, every Emergency Department visit should have a follow-up clinic visit with either a primary or a specialty clinic/provider. Please follow-up as instructed by your emergency provider today. With abdominal pain, we often recommend very close follow-up, such as the following day.    ADULTS:   Return to the Emergency Department right away if:      You get an oral temperature above 102oF or as directed by your provider.    You have blood in your stools. This may be bright red or appear as black, tarry stools.      You keep vomiting (throwing up) or cannot drink liquids.    You see blood when you vomit.     You cannot have a bowel movement or you cannot pass gas.    Your stomach gets bloated or bigger.    Your skin or the whites of your eyes look yellow.    You faint.    You have bloody, frequent or painful urination (peeing).    You have new symptoms or anything that worries you.    CHILDREN:  Return to the Emergency Department right away if your child has any of the above-listed symptoms or the following:      Pushes your hand away or screams/cries when his/her belly is touched.    You notice your child is very fussy or weak.    Your child is very tired and is too tired to eat or drink.    Your child is dehydrated.  Signs of dehydration can be:  o Significant change in the amount of wet diapers/urine.  o Your infant or child starts to have dry mouth and lips, or no saliva (spit) or tears.    PREGNANT WOMEN:  Return to the Emergency Department right away if you have any of the above-listed symptoms or the following:      You have bleeding, leaking fluid or passing tissue from the vagina.    You have worse pain or cramping, or pain in your shoulder or back.    You have vomiting that will not stop.    You have a temperature of 100oF or more.    Your baby is not moving as much as usual.    You faint.    You get a bad headache with or without eye problems and abdominal pain.    You have a seizure.    You have unusual discharge from your vagina and abdominal pain.    Abdominal pain is pretty common during pregnancy.  Your pain may or may not be related to your pregnancy. You should follow-up closely with your OB provider so they can evaluate you and your baby.  Until you follow-up with your regular provider, do  "the following:       Avoid sex and do not put anything in your vagina.    Drink clear fluids.    Only take medications approved by your provider.    MORE INFORMATION:    Appendicitis:  A possible cause of abdominal pain in any person who still has their appendix is acute appendicitis. Appendicitis is often hard to diagnose.  Testing does not always rule out early appendicitis or other causes of abdominal pain. Close follow-up with your provider and re-evaluations may be needed to figure out the reason for your abdominal pain.    Follow-up:  It is very important that you make an appointment with your clinic and go to the appointment.  If you do not follow-up with your primary provider, it may result in missing an important development which could result in permanent injury or disability and/or lasting pain.  If there is any problem keeping your appointment, call your provider or return to the Emergency Department.    Medications:  Take your medications as directed by your provider today.  Before using over-the-counter medications, ask your provider and make sure to take the medications as directed.  If you have any questions about medications, ask your provider.    Diet:  Resume your normal diet as much as possible, but do not eat fried, fatty or spicy foods while you have pain.  Do not drink alcohol or have caffeine.  Do not smoke tobacco.    Probiotics: If you have been given an antibiotic, you may want to also take a probiotic pill or eat yogurt with live cultures. Probiotics have \"good bacteria\" to help your intestines stay healthy. Studies have shown that probiotics help prevent diarrhea (loose stools) and other intestine problems (including C. diff infection) when you take antibiotics. You can buy these without a prescription in the pharmacy section of the store.     If you were given a prescription for medicine here today, be sure to read all of the information (including the package insert) that comes with " your prescription.  This will include important information about the medicine, its side effects, and any warnings that you need to know about.  The pharmacist who fills the prescription can provide more information and answer questions you may have about the medicine.  If you have questions or concerns that the pharmacist cannot address, please call or return to the Emergency Department.       Remember that you can always come back to the Emergency Department if you are not able to see your regular provider in the amount of time listed above, if you get any new symptoms, or if there is anything that worries you.          Tips to Control Acid Reflux    To control acid reflux, you ll need to make some basic diet and lifestyle changes. The simple steps outlined below may be all you ll need to ease discomfort.  Watch what you eat    Avoid fatty foods and spicy foods.    Eat fewer acidic foods, such as citrus and tomato-based foods. These can increase symptoms.    Limit drinking alcohol, caffeine, and fizzy beverages. All increase acid reflux.    Try limiting chocolate, peppermint, and spearmint. These can worsen acid reflux in some people.  Watch when you eat    Avoid lying down for 3 hours after eating.    Do not snack before going to bed.  Raise your head  Raising your head and upper body by 4 to 6 inches helps limit reflux when you re lying down. Put blocks under the head of your bed frame to raise it.  Other changes    Lose weight, if you need to    Don t exercise near bedtime    Avoid tight-fitting clothes    Limit aspirin and ibuprofen    Stop smoking   Date Last Reviewed: 7/1/2016 2000-2017 [employer]. [address] [place] [address], [place] [address]. All rights reserved. This information is not intended as a substitute for professional medical care. Always follow your healthcare professional's instructions.          Medicines for Acid Reflux  Your healthcare provider has told you that you have acid  reflux. This condition causes stomach acid to wash up into your throat. For most people, acid reflux is troubling but not dangerous. But left untreated, acid reflux sometimes damages the esophagus. Medicines can help control acid reflux and limit your risk of future problems.  Medicines for acid reflux  Your healthcare provider may prescribe medicine to help treat your acid reflux. Medicine will be based on your symptoms and any test results. Your provider will explain how to take your medicine. You will also be told about possible side effects.  Reducing stomach acid  Your provider may suggest antacids that you can buy over the counter. Antacids can give fast relief. Or you may be told to take a type of medicine called H2 blockers. These are available over the counter and by prescription (for higher doses).  Blocking stomach acid  In more severe cases, your healthcare provider may suggest stronger medicines such as proton pump inhibitors (PPIs). These keep the stomach from making acid. They are often prescribed for long-term use.  Other medicines  In some cases medicines to reduce or block stomach acid may not work. Then you may be switched to another type of medicine that helps your stomach empty better.     Date Last Reviewed: 10/1/2016    0351-4786 [employer]. [address] [place] [address]. All rights reserved. This information is not intended as a substitute for professional medical care. Always follow your healthcare professional's instructions.            24 Hour Appointment Hotline       To make an appointment at any [facility] clinic, call [phone] ([phone]). If you don't have a family doctor or clinic, we will help you find one. [facility] clinics are conveniently located to serve the needs of you and your family.          ED Discharge Orders     GASTROENTEROLOGY ADULT REF CONSULT ONLY       Preferred Location: [facility] [phone]      Please be aware that coverage of  these services is subject to the terms and limitations of your health insurance plan.  Call member services at your health plan with any benefit or coverage questions.  Any procedures must be performed at a [facility] facility OR coordinated by your clinic's referral office.    Please bring the following with you to your appointment:    (1) Any X-Rays, CTs or MRIs which have been performed.  Contact the facility where they were done to arrange for  prior to your scheduled appointment.    (2) List of current medications   (3) This referral request   (4) Any documents/labs given to you for this referral                     Review of your medicines      START taking        Dose / Directions Last dose taken    alum & mag hydroxide-simethicone 400-400-40 MG/5ML Susp suspension   Commonly known as:  MYLANTA ES/MAALOX  ES   Dose:  30 mL   Quantity:  355 mL        Take 30 mLs by mouth every 4 hours as needed for indigestion   Refills:  0        famotidine 20 MG tablet   Commonly known as:  PEPCID   Dose:  20 mg   Quantity:  60 tablet        Take 1 tablet (20 mg) by mouth 2 times daily   Refills:  0          Our records show that you are taking the medicines listed below. If these are incorrect, please call your family doctor or clinic.        Dose / Directions Last dose taken    ADDERALL 20 MG per tablet   Quantity:  90   Generic drug:  amphetamine-dextroamphetamine        1 tablet po tid   Refills:  0        albuterol 108 (90 BASE) MCG/ACT Inhaler   Commonly known as:  PROAIR HFA/PROVENTIL HFA/VENTOLIN HFA   Dose:  2 puff   Quantity:  1 Inhaler        Inhale 2 puffs into the lungs every 4 hours as needed (PRN cough)   Refills:  0        celeXA 40 MG tablet   Quantity:  90   Generic drug:  citalopram        1 TABLET DAILY   Refills:  1        LEVOTHROID PO        Refills:  0        ondansetron 4 MG ODT tab   Commonly known as:  ZOFRAN ODT   Dose:  4 mg   Quantity:  10 tablet        Take 1 tablet (4 mg) by mouth every 8  hours as needed   Refills:  0        oxyCODONE IR 5 MG tablet   Commonly known as:  ROXICODONE   Dose:  5 mg   Quantity:  12 tablet        Take 1 tablet (5 mg) by mouth every 6 hours as needed for pain   Refills:  0        WELLBUTRIN PO        Refills:  0                Prescriptions were sent or printed at these locations (2 Prescriptions)                   Other Prescriptions                Printed at Department/Unit printer (2 of 2)         famotidine (PEPCID) 20 MG tablet               alum & mag hydroxide-simethicone (MYLANTA ES/MAALOX  ES) 400-400-40 MG/5ML SUSP suspension                Procedures and tests performed during your visit     CBC with platelets differential    Comprehensive metabolic panel    Lipase    US Abdomen Limited      Orders Needing Specimen Collection     None      Pending Results     No orders found from 1/8/2018 to 1/11/2018.            Pending Culture Results     No orders found from 1/8/2018 to 1/11/2018.            Pending Results Instructions     If you had any lab results that were not finalized at the time of your Discharge, you can call the ED Lab Result RN at [phone]. You will be contacted by this team for any positive Lab results or changes in treatment. The nurses are available 7 days a week from 10A to 6:30P.  You can leave a message 24 hours per day and they will return your call.        Test Results From Your Hospital Stay        1/10/2018  7:34 PM      Component Results     Component Value Ref Range & Units Status    WBC 5.2 4.0 - 11.0 10e9/L Final    RBC Count 4.88 3.8 - 5.2 10e12/L Final    Hemoglobin 14.0 11.7 - 15.7 g/dL Final    Hematocrit 43.7 35.0 - 47.0 % Final    MCV 90 78 - 100 fl Final    MCH 28.7 26.5 - 33.0 pg Final    MCHC 32.0 31.5 - 36.5 g/dL Final    RDW 15.4 (H) 10.0 - 15.0 % Final    Platelet Count 342 150 - 450 10e9/L Final    Diff Method Automated Method  Final    % Neutrophils 64.1 % Final    % Lymphocytes 21.5 % Final    % Monocytes 12.1 %  Final    % Eosinophils 1.5 % Final    % Basophils 0.6 % Final    % Immature Granulocytes 0.2 % Final    Nucleated RBCs 0 0 /100 Final    Absolute Neutrophil 3.3 1.6 - 8.3 10e9/L Final    Absolute Lymphocytes 1.1 0.8 - 5.3 10e9/L Final    Absolute Monocytes 0.6 0.0 - 1.3 10e9/L Final    Absolute Eosinophils 0.1 0.0 - 0.7 10e9/L Final    Absolute Basophils 0.0 0.0 - 0.2 10e9/L Final    Abs Immature Granulocytes 0.0 0 - 0.4 10e9/L Final    Absolute Nucleated RBC 0.0  Final         1/10/2018  7:53 PM      Component Results     Component Value Ref Range & Units Status    Sodium 135 133 - 144 mmol/L Final    Potassium 3.7 3.4 - 5.3 mmol/L Final    Chloride 102 94 - 109 mmol/L Final    Carbon Dioxide 30 20 - 32 mmol/L Final    Anion Gap 3 3 - 14 mmol/L Final    Glucose 105 (H) 70 - 99 mg/dL Final    Urea Nitrogen 8 7 - 30 mg/dL Final    Creatinine 0.74 0.52 - 1.04 mg/dL Final    GFR Estimate 89 >60 mL/min/1.7m2 Final    Non  GFR Calc    GFR Estimate If Black >90 >60 mL/min/1.7m2 Final    African American GFR Calc    Calcium 8.3 (L) 8.5 - 10.1 mg/dL Final    Bilirubin Total 0.3 0.2 - 1.3 mg/dL Final    Albumin 3.3 (L) 3.4 - 5.0 g/dL Final    Protein Total 6.7 (L) 6.8 - 8.8 g/dL Final    Alkaline Phosphatase 64 40 - 150 U/L Final     (H) 0 - 50 U/L Final    AST 76 (H) 0 - 45 U/L Final         1/10/2018  7:53 PM      Component Results     Component Value Ref Range & Units Status    Lipase 138 73 - 393 U/L Final         1/10/2018  9:32 PM      Narrative     LIMITED ABDOMINAL (RIGHT UPPER QUADRANT) ULTRASOUND  1/10/2018 9:12 PM    HISTORY: Right upper quadrant pain.    COMPARISON: Ultrasound yesterday.    FINDINGS: The liver is normal in size. It demonstrates diffuse  increased echogenicity without focal lesions. The gallbladder is  normal without stones or sludge. No gallbladder wall thickening or  pericholecystic fluid is seen. The common bile duct is normal  measuring 0.4 cm in diameter. The visualized  portion of the pancreas  is normal. The right kidney is normal with no hydronephrosis or  abnormal mass. I see no definite change since the previous  examination.        Impression     IMPRESSION: There has been no change since an ultrasound yesterday.  Again seen is fatty infiltration of the liver. No other abnormality is  seen.    TESS MCINTOSH MD                Clinical Quality Measure: Blood Pressure Screening     Your blood pressure was checked while you were in the emergency department today. The last reading we obtained was  BP: 116/74 . Please read the guidelines below about what these numbers mean and what you should do about them.  If your systolic blood pressure (the top number) is less than 120 and your diastolic blood pressure (the bottom number) is less than 80, then your blood pressure is normal. There is nothing more that you need to do about it.  If your systolic blood pressure (the top number) is 120-139 or your diastolic blood pressure (the bottom number) is 80-89, your blood pressure may be higher than it should be. You should have your blood pressure rechecked within a year by a primary care provider.  If your systolic blood pressure (the top number) is 140 or greater or your diastolic blood pressure (the bottom number) is 90 or greater, you may have high blood pressure. High blood pressure is treatable, but if left untreated over time it can put you at risk for heart attack, stroke, or kidney failure. You should have your blood pressure rechecked by a primary care provider within the next 4 weeks.  If your provider in the emergency department today gave you specific instructions to follow-up with your doctor or provider even sooner than that, you should follow that instruction and not wait for up to 4 weeks for your follow-up visit.        Thank you for choosing [facility]       Thank you for choosing [facility] for your care. Our goal is always to provide you with excellent care. Hearing  "back from our patients is one way we can continue to improve our services. Please take a few minutes to complete the written survey that you may receive in the mail after you visit with us. Thank you!        [employer] Information     Somewhere lets you send messages to your doctor, view your test results, renew your prescriptions, schedule appointments and more. To sign up, go to [email or web address] . Click on \"Log in\" on the left side of the screen, which will take you to the Welcome page. Then click on \"Sign up Now\" on the right side of the page.     You will be asked to enter the access code listed below, as well as some personal information. Please follow the directions to create your username and password.     Your access code is: [email or web address]  Expires: 2018  7:58 AM     Your access code will  in 90 days. If you need help or a new code, please call your [facility] clinic or [phone].        Care EveryWhere ID     This is your Care EveryWhere ID. This could be used by other organizations to access your [facility] medical records  [id number]        Equal Access to Services     DAVID RILEY : Hadgin Burr, delta nayak, meliza mac, shwetha fowler . So [facility] [phone].    ATENCIÓN: Si habla español, tiene a fish disposición servicios gratuitos de asistencia lingüística. Kathie al [phone].    We comply with applicable federal civil rights laws and [place] laws. We do not discriminate on the basis of race, color, national origin, age, disability, sex, sexual orientation, or gender identity.            After Visit Summary       This is your record. Keep this with you and show to your community pharmacist(s) and doctor(s) at your next visit.                  "

## 2018-01-10 NOTE — ED AVS SNAPSHOT
[facility] Emergency Department    [address]    [facility] [address]    Phone:  [phone]    Fax:  [phone]                                       Catarino James   MRN: [id number]    Department:  [facility] Emergency Department   Date of Visit:  1/10/2018           After Visit Summary Signature Page     I have received my discharge instructions, and my questions have been answered. I have discussed any challenges I see with this plan with the nurse or doctor.    ..........................................................................................................................................  Patient/Patient Representative Signature      ..........................................................................................................................................  Patient Representative Print Name and Relationship to Patient    ..................................................               ................................................  Date                                            Time    ..........................................................................................................................................  Reviewed by Signature/Title    ...................................................              ..............................................  Date                                                            Time

## 2018-01-11 NOTE — DISCHARGE INSTRUCTIONS
Return to the ED if you are unable to tolerate fluids, intractable nausea or vomiting, severe abdominal pain, fevers >101 or other acute changes.  Please follow up with your PCP in 2-3 days.      Discharge Instructions  Abdominal Pain    Abdominal pain (belly pain) can be caused by many things. Your evaluation today does not show the exact cause for your pain. Your provider today has decided that it is unlikely your pain is due to a life threatening problem, or a problem requiring surgery or hospital admission. Sometimes those problems cannot be found right away, so it is very important that you follow up as directed.  Sometimes only the changes which occur over time allow the cause of your pain to be found.    Generally, every Emergency Department visit should have a follow-up clinic visit with either a primary or a specialty clinic/provider. Please follow-up as instructed by your emergency provider today. With abdominal pain, we often recommend very close follow-up, such as the following day.    ADULTS:  Return to the Emergency Department right away if:      You get an oral temperature above 102oF or as directed by your provider.    You have blood in your stools. This may be bright red or appear as black, tarry stools.      You keep vomiting (throwing up) or cannot drink liquids.    You see blood when you vomit.     You cannot have a bowel movement or you cannot pass gas.    Your stomach gets bloated or bigger.    Your skin or the whites of your eyes look yellow.    You faint.    You have bloody, frequent or painful urination (peeing).    You have new symptoms or anything that worries you.    CHILDREN:  Return to the Emergency Department right away if your child has any of the above-listed symptoms or the following:      Pushes your hand away or screams/cries when his/her belly is touched.    You notice your child is very fussy or weak.    Your child is very tired and is too tired to eat or drink.    Your child is  dehydrated.  Signs of dehydration can be:  o Significant change in the amount of wet diapers/urine.  o Your infant or child starts to have dry mouth and lips, or no saliva (spit) or tears.    PREGNANT WOMEN:  Return to the Emergency Department right away if you have any of the above-listed symptoms or the following:      You have bleeding, leaking fluid or passing tissue from the vagina.    You have worse pain or cramping, or pain in your shoulder or back.    You have vomiting that will not stop.    You have a temperature of 100oF or more.    Your baby is not moving as much as usual.    You faint.    You get a bad headache with or without eye problems and abdominal pain.    You have a seizure.    You have unusual discharge from your vagina and abdominal pain.    Abdominal pain is pretty common during pregnancy.  Your pain may or may not be related to your pregnancy. You should follow-up closely with your OB provider so they can evaluate you and your baby.  Until you follow-up with your regular provider, do the following:       Avoid sex and do not put anything in your vagina.    Drink clear fluids.    Only take medications approved by your provider.    MORE INFORMATION:    Appendicitis:  A possible cause of abdominal pain in any person who still has their appendix is acute appendicitis. Appendicitis is often hard to diagnose.  Testing does not always rule out early appendicitis or other causes of abdominal pain. Close follow-up with your provider and re-evaluations may be needed to figure out the reason for your abdominal pain.    Follow-up:  It is very important that you make an appointment with your clinic and go to the appointment.  If you do not follow-up with your primary provider, it may result in missing an important development which could result in permanent injury or disability and/or lasting pain.  If there is any problem keeping your appointment, call your provider or return to the Emergency  "Department.    Medications:  Take your medications as directed by your provider today.  Before using over-the-counter medications, ask your provider and make sure to take the medications as directed.  If you have any questions about medications, ask your provider.    Diet:  Resume your normal diet as much as possible, but do not eat fried, fatty or spicy foods while you have pain.  Do not drink alcohol or have caffeine.  Do not smoke tobacco.    Probiotics: If you have been given an antibiotic, you may want to also take a probiotic pill or eat yogurt with live cultures. Probiotics have \"good bacteria\" to help your intestines stay healthy. Studies have shown that probiotics help prevent diarrhea (loose stools) and other intestine problems (including C. diff infection) when you take antibiotics. You can buy these without a prescription in the pharmacy section of the store.     If you were given a prescription for medicine here today, be sure to read all of the information (including the package insert) that comes with your prescription.  This will include important information about the medicine, its side effects, and any warnings that you need to know about.  The pharmacist who fills the prescription can provide more information and answer questions you may have about the medicine.  If you have questions or concerns that the pharmacist cannot address, please call or return to the Emergency Department.       Remember that you can always come back to the Emergency Department if you are not able to see your regular provider in the amount of time listed above, if you get any new symptoms, or if there is anything that worries you.          Tips to Control Acid Reflux    To control acid reflux, you ll need to make some basic diet and lifestyle changes. The simple steps outlined below may be all you ll need to ease discomfort.  Watch what you eat    Avoid fatty foods and spicy foods.    Eat fewer acidic foods, such as " citrus and tomato-based foods. These can increase symptoms.    Limit drinking alcohol, caffeine, and fizzy beverages. All increase acid reflux.    Try limiting chocolate, peppermint, and spearmint. These can worsen acid reflux in some people.  Watch when you eat    Avoid lying down for 3 hours after eating.    Do not snack before going to bed.  Raise your head  Raising your head and upper body by 4 to 6 inches helps limit reflux when you re lying down. Put blocks under the head of your bed frame to raise it.  Other changes    Lose weight, if you need to    Don t exercise near bedtime    Avoid tight-fitting clothes    Limit aspirin and ibuprofen    Stop smoking   Date Last Reviewed: 7/1/2016 2000-2017 [employer]. [address] [place] [address], [place] [address]. All rights reserved. This information is not intended as a substitute for professional medical care. Always follow your healthcare professional's instructions.          Medicines for Acid Reflux  Your healthcare provider has told you that you have acid reflux. This condition causes stomach acid to wash up into your throat. For most people, acid reflux is troubling but not dangerous. But left untreated, acid reflux sometimes damages the esophagus. Medicines can help control acid reflux and limit your risk of future problems.  Medicines for acid reflux  Your healthcare provider may prescribe medicine to help treat your acid reflux. Medicine will be based on your symptoms and any test results. Your provider will explain how to take your medicine. You will also be told about possible side effects.  Reducing stomach acid  Your provider may suggest antacids that you can buy over the counter. Antacids can give fast relief. Or you may be told to take a type of medicine called H2 blockers. These are available over the counter and by prescription (for higher doses).  Blocking stomach acid  In more severe cases, your healthcare provider may suggest stronger  medicines such as proton pump inhibitors (PPIs). These keep the stomach from making acid. They are often prescribed for long-term use.  Other medicines  In some cases medicines to reduce or block stomach acid may not work. Then you may be switched to another type of medicine that helps your stomach empty better.     Date Last Reviewed: 10/1/2016    4463-0261 [employer]. [address] [place] [address]. All rights reserved. This information is not intended as a substitute for professional medical care. Always follow your healthcare professional's instructions.

## 2018-01-11 NOTE — ED PROVIDER NOTES
"  History     Chief Complaint:  Abdominal Pain    HPI   Catarino James is a 37 year old female who presents for re-evaluation of abdominal pain. The patient was seen in the ED yesterday for evaluation of constant diffuse abdominal pain that started on Monday morning, 2 days ago. She had one episode of vomiting after eating a piece of cake yesterday. Work up in the ED including RUQ US, CT abdomen pelvis, labs, and urine were unremarkable and she was discharged home with Oxycodone and Zofran for symptom control. The patient was seen at urgent care again this afternoon and was sent back to the ED for further evaluation. She reports she has pain in her right upper abdomen that worsens when she eats. She denies vomiting. She denies fever, diarrhea, constipation, urinary symptoms, vaginal bleeding or discharge. The patient states she just wants us to \"fix her\".    Results from yesterday:   CT Abdomen Pelvis w Contrast  1. No convincing cause of acute pain identified in the abdomen or  pelvis.  2. A 0.6 cm appendicolith is present in the distal appendix, but the  appendix is otherwise unremarkable and there is therefore no  convincing evidence of acute appendicitis.  Report per radiology: Rodrigo Donahue MD (01/09/18 06:05:04)     Abdomen US, Limited (RUQ)  1. Unremarkable appearance of the gallbladder.  2. No biliary dilatation.  3. Mild diffuse fatty infiltration of the liver.  Report per radiology: Rodrigo Donahue MD (01/09/18 06:56:51)     CMP: , , otherwise WNL (Creatinine 0.78)   CBC:  WBC 5.7, HGB 14.8,    Lipase: 169  UA: Slightly cloudy yellow urine, squamous epithelials 4, WBC 3, RBC 3, Mucous present, otherwise WNL   HCG Qualitative Pregnancy: Negative.     Allergies:  No Known Allergies     Medications:    oxyCODONE IR (ROXICODONE) 5 MG tablet  ondansetron (ZOFRAN ODT) 4 MG ODT tab  BuPROPion HCl (WELLBUTRIN PO)  Levothyroxine Sodium (LEVOTHROID PO)  albuterol (PROAIR HFA, " "PROVENTIL HFA, VENTOLIN HFA) 108 (90 BASE) MCG/ACT inhaler  CELEXA 40 MG OR TABS  ADDERALL 20 MG OR TABS    Past Medical History:    Anemia  B12 deficiency  Thyroid disease  ADD  Depression    Past Surgical History:     section    Family History:    History reviewed. No pertinent family history.     Social History:  Smoking status: Never smoker  Alcohol use: No  Marital Status:  Single [1]     Review of Systems   Constitutional: Negative for fever.   Gastrointestinal: Positive for abdominal pain. Negative for blood in stool, constipation, diarrhea, nausea and vomiting.   Genitourinary: Negative for difficulty urinating, dysuria, flank pain and hematuria.   All other systems reviewed and are negative.      Physical Exam   Patient Vitals for the past 24 hrs:   BP Temp Temp src Pulse Resp SpO2 Height Weight   01/10/18 2015 116/74 - - - - 98 % - -   01/10/18 2000 114/73 - - - - 96 % - -   01/10/18 1915 113/81 - - - - 97 % - -   01/10/18 1830 106/66 - - - - 96 % - -   01/10/18 1702 (!) 141/99 98.4  F (36.9  C) Oral 65 16 97 % 1.702 m (5' 7\") 81.6 kg (180 lb)       Physical Exam  Physical Exam   General: Resting on the bed.  Frustrated appearing  Head: No obvious trauma to head.  Ears, Nose, Throat:  External ears normal.  Nose normal.    Eyes:  Conjunctivae clear.  Pupils are equal, round, and reactive.   Neck: Normal range of motion.  Neck supple.   CV: Regular rate and rhythm.  No murmurs.      Respiratory: Effort normal and breath sounds normal.  No wheezing or crackles.   Gastrointestinal: Soft.  No distension. There is epigastric and RUQ tenderness.  There is no rigidity, no rebound and no guarding.   Neuro: Alert. Moving all extremities appropriately.  Normal speech.    Skin: Skin is warm and dry.  No rash noted.       Emergency Department Course   Imaging:  Radiographic findings were communicated with the patient who voiced understanding of the findings.    US Abdomen Limited  There has been no change " since an ultrasound yesterday.  Again seen is fatty infiltration of the liver. No other abnormality is  seen.  As read by Radiology.    Laboratory:  CBC: WNL (WBC 5.2, HGB 14.0, )   CMP: Glucose 105(H), Calcium 8.3(L), Albumin 3.3(L), Protein total 6.7(L), (H), AST 76(H), o/w WNL (Creatinine 0.74)  Lipase: 138    Interventions:  1935: Toradol 15 mg IV  2149: GI Cocktail - Maalox 15 mL, Viscous Lidocaine 15 mL, 30 mL suspension PO    Emergency Department Course:  Past medical records, nursing notes, and vitals reviewed.  1850: I performed an exam of the patient and obtained history, as documented above.  IV inserted and blood drawn.  The patient was sent for a RUQ US while in the emergency department, findings above.    2135: I rechecked the patient. Explained findings to the patient.    2201: I rechecked the patient.  Findings and plan explained to the Patient. Patient discharged home with instructions regarding supportive care, medications, and reasons to return. The importance of close follow-up was reviewed.     Impression & Plan      Medical Decision Making:  Catarino James is a 37 year old female, otherwise healthy, presenting with abdominal pain. Vital signs unremarkable. Broad differential pursued including but not limited to peptic ulcer disease, gastritis, GERD, biliary colic, cholecystitis, hepatitis, etc. CBC without any leukocytosis or anemia. BMP without any acute electrolyte or metabolic or renal abnormalities. LFTs show mild elevation in ALT and AST which are actually improved from yesterday. Alkphos and T bili normal. Lipase normal, not concerning for obstructive biliary etiology, hepatitis, or pancreatitis. Given her focal right upper quadrant pain, did repeat an ultrasound of the RUQ which shows no evidence of cholecystitis or biliary sludge or stones. Patient was given a GI cocktail. Considered possible peptic ulcer disease versus gastritis versus GERD. Plan to start patient on  pepcid and have her follow up with gastroenterology. Patient may benefit from an endoscopy. She is well appearing and non-toxic. The patient is focal to the upper abdomen, not indicative or a need for a CT. Additionally, she had a CT scan yesterday that was normal. No signs of any acute surgical process. Her abdomen is benign without any acute surgical process on examination. Overall, patient will be discharged in stable condition with above interventions. Advised to follow up closely. No other acute events under my care.     Diagnosis:    ICD-10-CM   1. Acute gastritis without hemorrhage, unspecified gastritis type K29.00   2. Abdominal pain, epigastric R10.13   3. Gastroesophageal reflux disease without esophagitis K21.9     Disposition: Discharged to home    Discharge Medications:  New Prescriptions    ALUM & MAG HYDROXIDE-SIMETHICONE (MYLANTA ES/MAALOX  ES) 400-400-40 MG/5ML SUSP SUSPENSION    Take 30 mLs by mouth every 4 hours as needed for indigestion    FAMOTIDINE (PEPCID) 20 MG TABLET    Take 1 tablet (20 mg) by mouth 2 times daily       Valerie Urbina  1/10/2018   [facility] EMERGENCY DEPARTMENT    I, Valerie Urbina, am serving as a scribe at 6:50 PM on 1/10/2018 to document services personally performed by Yessenia Campo MD based on my observations and the provider's statements to me.        Yessenia Campo MD  01/10/18 9974

## 2018-05-02 NOTE — ED NOTES
"Seen in ED last Monday night for abdominal pain.  Seen at  today who sent her back to ED.  LAbs, US, CT were \"normal on Monday night\".  No vomiting but pain worsens after eating. Patient alert and oriented x3.  Airway, breathing and circulation intact.    " Pt resting on NC  Order placed for O2       Lenor Duane, RT  05/02/18  12:50 AM

## 2018-07-02 LAB
GLUCOSE SERPL-MCNC: 96 MG/DL (ref 70–100)
HBA1C MFR BLD: 5.2 % (ref 4–5.6)
TSH SERPL-ACNC: 2.68 UIU/ML (ref 0.3–4.5)

## 2018-09-25 ENCOUNTER — TRANSFERRED RECORDS (OUTPATIENT)
Dept: HEALTH INFORMATION MANAGEMENT | Facility: CLINIC | Age: 38
End: 2018-09-25

## 2018-09-27 ENCOUNTER — TRANSFERRED RECORDS (OUTPATIENT)
Dept: HEALTH INFORMATION MANAGEMENT | Facility: CLINIC | Age: 38
End: 2018-09-27

## 2018-10-22 ENCOUNTER — OFFICE VISIT (OUTPATIENT)
Dept: NEUROSURGERY | Facility: CLINIC | Age: 38
End: 2018-10-22
Attending: NEUROLOGICAL SURGERY
Payer: COMMERCIAL

## 2018-10-22 VITALS
HEART RATE: 90 BPM | BODY MASS INDEX: 30.73 KG/M2 | WEIGHT: 195.8 LBS | OXYGEN SATURATION: 98 % | HEIGHT: 67 IN | SYSTOLIC BLOOD PRESSURE: 125 MMHG | DIASTOLIC BLOOD PRESSURE: 88 MMHG

## 2018-10-22 DIAGNOSIS — D35.2 PITUITARY MACROADENOMA (H): Primary | ICD-10-CM

## 2018-10-22 PROCEDURE — G0463 HOSPITAL OUTPT CLINIC VISIT: HCPCS

## 2018-10-22 PROCEDURE — 99204 OFFICE O/P NEW MOD 45 MIN: CPT | Performed by: NEUROLOGICAL SURGERY

## 2018-10-22 ASSESSMENT — PAIN SCALES - GENERAL: PAINLEVEL: NO PAIN (0)

## 2018-10-22 NOTE — PATIENT INSTRUCTIONS
Surgery scheduled at [facility] for Endoscopic Endonasal Resection of Suprasellar Mass    -Will need MRI and CT prior to surgery   -Will need visual field testing with Dr. Sandro Bedoya at [facility]    Pre-Operative:  -Surgical risks: blood clots in the leg or lung, problems urinating, nerve damage, drainage from the incision, infection, stiffness.  -Pre-operative physical with primary care physician within 30 days of surgical date.   -Stop all solid foods and liquids 8 hours prior to surgery.  - Discontinue Aspirin, NSAIDs (Advil, Ibuprofen, Naproxen, Nuprin, Diclofenac, Meloxicam, Aleve, Celebrex) x 7 days prior to surgical date. After surgery, do not begin taking these medications until given clearance. It may cause bleeding and interfere with healing.    - May try Tylenol for pain.    Post-Operative:  -2-3 night hospitalization stay  - Post operative pain may pain medications and muscle relaxants. You will receive medication upon discharge.  -Do NOT drive while taking narcotic pain medication.  -Call clinic immediately if any signs of infection occur: redness, swelling, warmth, drainage, and fever of 101 degrees or higher.   -Post operative activity limitations: No coughing, sneezing, nose blowing for 2 weeks. No bending forward or lifting greater than 10 pounds for atleast 4 weeks.  -If you are currently employed, you will need to be off work for recovery and healing. Please fax any FMLA/short term disability paperwork to [phone]. You may call our clinic when you'd like to return to work and we can provide a work letter.   - Follow up appointments: Debridements at 1, 2, 4, and 8 weeks post- op. Will need MRI completed at 3 months post-op appointment. Please call to schedule follow up appointment at [phone].   -Go to the nearest Emergency Room for evaluation if you develop: Acute changes in the level of consciousness (increased confusion, memory loss, speech abnormalities), any change  in hearing or vision, increased headaches, new onset of seizures.

## 2018-10-22 NOTE — MR AVS SNAPSHOT
After Visit Summary   10/22/2018    Catarino James    MRN: [id number]           Patient Information     Date Of Birth          1980        Visit Information        Provider Department      10/22/2018 12:30 PM Mervin Pack MD [facility]        Today's Diagnoses     Pituitary macroadenoma (H)    -  1      Care Instructions    Surgery scheduled at [facility] for Endoscopic Endonasal Resection of Suprasellar Mass    -Will need MRI and CT prior to surgery   -Will need visual field testing with Dr. Sandro Bedoya at [facility]    Pre-Operative:  -Surgical risks: blood clots in the leg or lung, problems urinating, nerve damage, drainage from the incision, infection, stiffness.  -Pre-operative physical with primary care physician within 30 days of surgical date.   -Stop all solid foods and liquids 8 hours prior to surgery.  - Discontinue Aspirin, NSAIDs (Advil, Ibuprofen, Naproxen, Nuprin, Diclofenac, Meloxicam, Aleve, Celebrex) x 7 days prior to surgical date. After surgery, do not begin taking these medications until given clearance. It may cause bleeding and interfere with healing.    - May try Tylenol for pain.    Post-Operative:  -2-3 night hospitalization stay  - Post operative pain may pain medications and muscle relaxants. You will receive medication upon discharge.  -Do NOT drive while taking narcotic pain medication.  -Call clinic immediately if any signs of infection occur: redness, swelling, warmth, drainage, and fever of 101 degrees or higher.   -Post operative activity limitations: No coughing, sneezing, nose blowing for 2 weeks. No bending forward or lifting greater than 10 pounds for atleast 4 weeks.  -If you are currently employed, you will need to be off work for recovery and healing. Please fax any FMLA/short term disability paperwork to [phone]. You may call our clinic when you'd like to return to work and we can provide a work  letter.   - Follow up appointments: Debridements at 1, 2, 4, and 8 weeks post- op. Will need MRI completed at 3 months post-op appointment. Please call to schedule follow up appointment at [phone].   -Go to the nearest Emergency Room for evaluation if you develop: Acute changes in the level of consciousness (increased confusion, memory loss, speech abnormalities), any change in hearing or vision, increased headaches, new onset of seizures.                Follow-ups after your visit        Additional Services     OPHTHALMOLOGY ADULT REFERRAL       Your provider has referred you to: N: [facility] [place] [phone] [email or web address]  Dr. Sandro Bedoya    Please be aware that coverage of these services is subject to the terms and limitations of your health insurance plan.  Call member services at your health plan with any benefit or coverage questions.      Please bring the following with you to your appointment:    (1) Any X-Rays, CTs or MRIs which have been performed.  Contact the facility where they were done to arrange for  prior to your scheduled appointment.    (2) List of current medications  (3) This referral request   (4) Any documents/labs given to you for this referral                  Future tests that were ordered for you today     Open Future Orders        Priority Expected Expires Ordered    MR Brain w Contrast Stereotactic Routine  10/22/2019 10/22/2018    CT Head w/o Contrast Routine  10/22/2019 10/22/2018            Who to contact     If you have questions or need follow up information about today's clinic visit or your schedule please contact [facility] directly at [phone].  Normal or non-critical lab and imaging results will be communicated to you by MyChart, letter or phone within 4 business days after the clinic has received the results. If you do not hear from us within 7 days, please contact the clinic through  "MobileDayhart or phone. If you have a critical or abnormal lab result, we will notify you by phone as soon as possible.  Submit refill requests through Studentgems or call your pharmacy and they will forward the refill request to us. Please allow 3 business days for your refill to be completed.          Additional Information About Your Visit        [email or web address] Information     Studentgems lets you send messages to your doctor, view your test results, renew your prescriptions, schedule appointments and more. To sign up, go to [email or web address] . Click on \"Log in\" on the left side of the screen, which will take you to the Welcome page. Then click on \"Sign up Now\" on the right side of the page.     You will be asked to enter the access code listed below, as well as some personal information. Please follow the directions to create your username and password.     Your access code is: [email or web address]  Expires: 2019  1:24 PM     Your access code will  in 90 days. If you need help or a new code, please call your [place] clinic or [phone].        Care EveryWhere ID     This is your Care EveryWhere ID. This could be used by other organizations to access your [place] medical records  [id number]        Your Vitals Were     Pulse Height Pulse Oximetry BMI (Body Mass Index)          90 5' 6.53\" (1.69 m) 98% 31.1 kg/m2         Blood Pressure from Last 3 Encounters:   10/22/18 125/88   01/10/18 122/76   18 (!) 139/99    Weight from Last 3 Encounters:   10/22/18 195 lb 12.8 oz (88.8 kg)   01/10/18 180 lb (81.6 kg)   18 181 lb 7 oz (82.3 kg)              We Performed the Following     OPHTHALMOLOGY ADULT REFERRAL          Today's Medication Changes          These changes are accurate as of 10/22/18  1:24 PM.  If you have any questions, ask your nurse or doctor.               Stop taking these medicines if you haven't already. Please contact your care team if you have questions.     albuterol 108 (90 Base) MCG/ACT " inhaler   Commonly known as:  PROAIR HFA/PROVENTIL HFA/VENTOLIN HFA   Stopped by:  Mervin Pack MD           alum & mag hydroxide-simethicone 400-400-40 MG/5ML Susp suspension   Commonly known as:  MYLANTA ES/MAALOX  ES   Stopped by:  Mervin Pack MD           celeXA 40 MG tablet   Generic drug:  citalopram   Stopped by:  Mervin Pack MD           famotidine 20 MG tablet   Commonly known as:  PEPCID   Stopped by:  Mervin Pack MD           oxyCODONE IR 5 MG tablet   Commonly known as:  ROXICODONE   Stopped by:  Mervin Pack MD                    Primary Care Provider Office Phone # Fax #    Katalina Muñoz [phone]       [facility] [address] [place] [address]        Equal Access to Services     [facility]: Hadii aad ku hadasho Soomaali, waaxda luqadaha, qaybta kaalmada adeegyada, waxay idiin hayaan adeenoc fowler . So [facility] [phone].    ATENCIÓN: Si habla español, tiene a fish disposición servicios gratuitos de asistencia lingüística. Llame al [phone].    We comply with applicable federal civil rights laws and [place] laws. We do not discriminate on the basis of race, color, national origin, age, disability, sex, sexual orientation, or gender identity.            Thank you!     Thank you for choosing [facility]  for your care. Our goal is always to provide you with excellent care. Hearing back from our patients is one way we can continue to improve our services. Please take a few minutes to complete the written survey that you may receive in the mail after your visit with us. Thank you!             Your Updated Medication List - Protect others around you: Learn how to safely use, store and throw away your medicines at www.disposemymeds.org.          This list is accurate as of 10/22/18  1:24 PM.  Always use your most recent med list.                   Brand Name Dispense Instructions for use  Diagnosis    ADDERALL 20 MG per tablet   Generic drug:  amphetamine-dextroamphetamine     90    1 tablet po tid    Major depressive disorder, single episode, mild (H)       LEVOTHROID PO           WELLBUTRIN PO

## 2018-10-22 NOTE — NURSING NOTE
"Catarino James is a 37 year old female who presents for:  Chief Complaint   Patient presents with     Neurologic Problem     Referred from [facility] endo, Pituitary tumor        Vitals:    Vitals:    10/22/18 1233   BP: 125/88   BP Location: Right arm   Patient Position: Sitting   Cuff Size: Adult Regular   Pulse: 90   SpO2: 98%   Weight: 195 lb 12.8 oz (88.8 kg)   Height: 5' 6.53\" (1.69 m)       BMI:  Estimated body mass index is 31.1 kg/(m^2) as calculated from the following:    Height as of this encounter: 5' 6.53\" (1.69 m).    Weight as of this encounter: 195 lb 12.8 oz (88.8 kg).    Pain Score:  No Pain (0)      Do you feel safe in your environment?  Yes      Whitney Torres          "

## 2018-10-22 NOTE — PROGRESS NOTES
"37F w/ pituitary macroadenoma, optic chiasmal compression.  Discovered on workup for the menstrual irregularities.  Endocrine Axis significant for hypothyroidism.  2 months of progressive 6 out of 10 bitemporal headaches, intermittent blurred vision with difficulty reading.  MRI shows an expansile 2 cm suprasellar mass with optic chiasmal compression.         Past Medical History:   Diagnosis Date     Anemia      B12 deficiency      Thyroid disease      Past Surgical History:   Procedure Laterality Date     C C-SEC ONLY,PREV C-SEC       Social History     Social History     Marital status: Single     Spouse name: N/A     Number of children: 1     Years of education: N/A     Occupational History     homemaker None      Social History Main Topics     Smoking status: Never Smoker     Smokeless tobacco: Never Used     Alcohol use No     Drug use: No     Sexual activity: Yes     Partners: Male     Other Topics Concern     Stress Concern Yes     Exercise No     Seat Belt Yes     Social History Narrative     Family History   Problem Relation Age of Onset     C.A.D. No family hx of      Diabetes No family hx of      Breast Cancer No family hx of      Cancer - colorectal No family hx of      Prostate Cancer No family hx of         ROS: 10 point ROS neg other than the symptoms noted above in the HPI.    Physical Exam  /88 (BP Location: Right arm, Patient Position: Sitting, Cuff Size: Adult Regular)  Pulse 90  Ht 1.69 m (5' 6.53\")  Wt 88.8 kg (195 lb 12.8 oz)  SpO2 98%  BMI 31.1 kg/m2  HEENT:  Normocephalic, atraumatic.  PERRLA.  EOM s intact.  Visual fields full to gross exam  Neck:  Supple, non-tender, without lymphadenopathy.  Heart:  No peripheral edema  Lungs:  No SOB  Abdomen:  Non-distended.   Skin:  Warm and dry.  Extremities:  No edema, cyanosis or clubbing.  Psychiatric:  No apparent distress  Musculoskeletal:  Normal bulk and tone    NEUROLOGICAL EXAMINATION:     Mental status:  Alert and Oriented x 3, " speech is fluent.  Cranial nerves:  II-XII intact.   Motor:    Shoulder Abduction:  Right:  5/5   Left:  5/5  Biceps:                      Right:  5/5   Left:  5/5  Triceps:                     Right:  5/5   Left:  5/5  Wrist Extensors:       Right:  5/5   Left:  5/5  Wrist Flexors:           Right:  5/5   Left:  5/5  interosseus :            Right:  5/5   Left:  5/5   Hip Flexor:                Right: 5/5  Left:  5/5  Hip Adductor:             Right:  5/5  Left:  5/5  Hip Abductor:             Right:  5/5  Left:  5/5  Gastroc Soleus:        Right:  5/5  Left:  5/5  Tib/Ant:                      Right:  5/5  Left:  5/5  EHL:                     Right:  5/5  Left:  5/5  Sensation:  Intact  Reflexes:  Negative Babinski.  Negative Clonus.  Negative Jorgensen's.  Coordination:  Smooth finger to nose testing.   Negative pronator drift.  Smooth tandem walking.    A/P:  37F w/ pituitary macroadenoma, optic chiasmal compression    I had a discussion with the patient, reviewing the history, symptoms, and imaging  Based on the size and the optic chiasmal compression, recommend surgical resection  We will obtain baseline visual field testing  Risks and benefits discussed and she wishes to proceed

## 2018-10-22 NOTE — LETTER
"    10/22/2018         RE: Catarino James  [address]  [facility] [address]        Dear Colleague,    Thank you for referring your patient, Catarino James, to the [facility]. Please see a copy of my visit note below.    37F w/ pituitary macroadenoma, optic chiasmal compression.  Discovered on workup for the menstrual irregularities.  Endocrine Axis significant for hypothyroidism.  2 months of progressive 6 out of 10 bitemporal headaches, intermittent blurred vision with difficulty reading.  MRI shows an expansile 2 cm suprasellar mass with optic chiasmal compression.         Past Medical History:   Diagnosis Date     Anemia      B12 deficiency      Thyroid disease      Past Surgical History:   Procedure Laterality Date     C C-SEC ONLY,PREV C-SEC       Social History     Social History     Marital status: Single     Spouse name: N/A     Number of children: 1     Years of education: N/A     Occupational History     homemaker None      Social History Main Topics     Smoking status: Never Smoker     Smokeless tobacco: Never Used     Alcohol use No     Drug use: No     Sexual activity: Yes     Partners: Male     Other Topics Concern     Stress Concern Yes     Exercise No     Seat Belt Yes     Social History Narrative     Family History   Problem Relation Age of Onset     C.A.D. No family hx of      Diabetes No family hx of      Breast Cancer No family hx of      Cancer - colorectal No family hx of      Prostate Cancer No family hx of         ROS: 10 point ROS neg other than the symptoms noted above in the HPI.    Physical Exam  /88 (BP Location: Right arm, Patient Position: Sitting, Cuff Size: Adult Regular)  Pulse 90  Ht 1.69 m (5' 6.53\")  Wt 88.8 kg (195 lb 12.8 oz)  SpO2 98%  BMI 31.1 kg/m2  HEENT:  Normocephalic, atraumatic.  PERRLA.  EOM s intact.  Visual fields full to gross exam  Neck:  Supple, non-tender, without lymphadenopathy.  Heart:  No peripheral " edema  Lungs:  No SOB  Abdomen:  Non-distended.   Skin:  Warm and dry.  Extremities:  No edema, cyanosis or clubbing.  Psychiatric:  No apparent distress  Musculoskeletal:  Normal bulk and tone    NEUROLOGICAL EXAMINATION:     Mental status:  Alert and Oriented x 3, speech is fluent.  Cranial nerves:  II-XII intact.   Motor:    Shoulder Abduction:  Right:  5/5   Left:  5/5  Biceps:                      Right:  5/5   Left:  5/5  Triceps:                     Right:  5/5   Left:  5/5  Wrist Extensors:       Right:  5/5   Left:  5/5  Wrist Flexors:           Right:  5/5   Left:  5/5  interosseus :            Right:  5/5   Left:  5/5   Hip Flexor:                Right: 5/5  Left:  5/5  Hip Adductor:             Right:  5/5  Left:  5/5  Hip Abductor:             Right:  5/5  Left:  5/5  Gastroc Soleus:        Right:  5/5  Left:  5/5  Tib/Ant:                      Right:  5/5  Left:  5/5  EHL:                     Right:  5/5  Left:  5/5  Sensation:  Intact  Reflexes:  Negative Babinski.  Negative Clonus.  Negative Jorgensen's.  Coordination:  Smooth finger to nose testing.   Negative pronator drift.  Smooth tandem walking.    A/P:  37F w/ pituitary macroadenoma, optic chiasmal compression    I had a discussion with the patient, reviewing the history, symptoms, and imaging  Based on the size and the optic chiasmal compression, recommend surgical resection  We will obtain baseline visual field testing  Risks and benefits discussed and she wishes to proceed         Again, thank you for allowing me to participate in the care of your patient.        Sincerely,        Mervin Pack MD

## 2018-10-23 ENCOUNTER — TELEPHONE (OUTPATIENT)
Dept: NEUROSURGERY | Facility: CLINIC | Age: 38
End: 2018-10-23

## 2018-10-23 NOTE — TELEPHONE ENCOUNTER
Type of surgery: Endoscopic endonasal resection of suprasellar mass  [facility]  Date and time of surgery: 11/13/2018 at 1:10 pm   Surgeon: Dr. Pack   Pre-Op Appt Date: Discussed   Post-Op Appt Date: 01/01/2019   Packet sent out: Yes  Pre-cert/Authorization completed:  Yes  Date: 10/22/2018

## 2018-11-01 ENCOUNTER — TRANSFERRED RECORDS (OUTPATIENT)
Dept: HEALTH INFORMATION MANAGEMENT | Facility: CLINIC | Age: 38
End: 2018-11-01

## 2018-11-01 LAB
CREAT SERPL-MCNC: 1.1 MG/DL (ref 0.55–1.02)
GFR SERPL CREATININE-BSD FRML MDRD: >60 ML/MIN/1.73M2

## 2018-11-02 ENCOUNTER — HOSPITAL ENCOUNTER (OUTPATIENT)
Dept: CT IMAGING | Facility: CLINIC | Age: 38
End: 2018-11-02
Attending: NEUROLOGICAL SURGERY
Payer: COMMERCIAL

## 2018-11-02 ENCOUNTER — HOSPITAL ENCOUNTER (OUTPATIENT)
Dept: MRI IMAGING | Facility: CLINIC | Age: 38
Discharge: HOME | End: 2018-11-02
Attending: NEUROLOGICAL SURGERY | Admitting: NEUROLOGICAL SURGERY
Payer: COMMERCIAL

## 2018-11-02 DIAGNOSIS — D35.2 PITUITARY MACROADENOMA (H): ICD-10-CM

## 2018-11-02 PROCEDURE — 70450 CT HEAD/BRAIN W/O DYE: CPT

## 2018-11-02 PROCEDURE — 70552 MRI BRAIN STEM W/DYE: CPT

## 2018-11-02 PROCEDURE — A9585 GADOBUTROL INJECTION: HCPCS | Performed by: NEUROLOGICAL SURGERY

## 2018-11-02 PROCEDURE — 25500064 ZZH RX 255 OP 636: Performed by: NEUROLOGICAL SURGERY

## 2018-11-02 RX ORDER — GADOBUTROL 604.72 MG/ML
20 INJECTION INTRAVENOUS ONCE
Status: COMPLETED | OUTPATIENT
Start: 2018-11-02 | End: 2018-11-02

## 2018-11-02 RX ADMIN — GADOBUTROL 20 ML: 604.72 INJECTION INTRAVENOUS at 14:32

## 2018-11-05 ENCOUNTER — TRANSFERRED RECORDS (OUTPATIENT)
Dept: HEALTH INFORMATION MANAGEMENT | Facility: CLINIC | Age: 38
End: 2018-11-05

## 2018-11-06 ENCOUNTER — APPOINTMENT (OUTPATIENT)
Dept: MRI IMAGING | Facility: CLINIC | Age: 38
DRG: 615 | End: 2018-11-06
Attending: PHYSICIAN ASSISTANT
Payer: COMMERCIAL

## 2018-11-06 ENCOUNTER — ANESTHESIA EVENT (OUTPATIENT)
Dept: SURGERY | Facility: CLINIC | Age: 38
DRG: 615 | End: 2018-11-06
Payer: COMMERCIAL

## 2018-11-06 ENCOUNTER — HOSPITAL ENCOUNTER (INPATIENT)
Facility: CLINIC | Age: 38
LOS: 2 days | Discharge: HOME | DRG: 615 | End: 2018-11-08
Attending: NEUROLOGICAL SURGERY | Admitting: NEUROLOGICAL SURGERY
Payer: COMMERCIAL

## 2018-11-06 ENCOUNTER — ANESTHESIA (OUTPATIENT)
Dept: SURGERY | Facility: CLINIC | Age: 38
DRG: 615 | End: 2018-11-06
Payer: COMMERCIAL

## 2018-11-06 DIAGNOSIS — E89.3 S/P TRANSSPHENOIDAL HYPOPHYSECTOMY (H): Primary | ICD-10-CM

## 2018-11-06 LAB
ABO + RH BLD: NORMAL
ABO + RH BLD: NORMAL
BLD GP AB SCN SERPL QL: NORMAL
BLOOD BANK CMNT PATIENT-IMP: NORMAL
GLUCOSE BLDC GLUCOMTR-MCNC: 102 MG/DL (ref 70–99)
GLUCOSE BLDC GLUCOMTR-MCNC: 86 MG/DL (ref 70–99)
HCG UR QL: NEGATIVE
SPECIMEN EXP DATE BLD: NORMAL

## 2018-11-06 PROCEDURE — 71000014 ZZH RECOVERY PHASE 1 LEVEL 2 FIRST HR: Performed by: NEUROLOGICAL SURGERY

## 2018-11-06 PROCEDURE — 25000125 ZZHC RX 250: Performed by: NEUROLOGICAL SURGERY

## 2018-11-06 PROCEDURE — 25000128 H RX IP 250 OP 636: Performed by: ANESTHESIOLOGY

## 2018-11-06 PROCEDURE — 62165 REMOVE PITUIT TUMOR W/SCOPE: CPT | Mod: AS | Performed by: PHYSICIAN ASSISTANT

## 2018-11-06 PROCEDURE — 36415 COLL VENOUS BLD VENIPUNCTURE: CPT | Performed by: NEUROLOGICAL SURGERY

## 2018-11-06 PROCEDURE — 27210794 ZZH OR GENERAL SUPPLY STERILE: Performed by: NEUROLOGICAL SURGERY

## 2018-11-06 PROCEDURE — C1763 CONN TISS, NON-HUMAN: HCPCS | Performed by: NEUROLOGICAL SURGERY

## 2018-11-06 PROCEDURE — 8E09XBG COMPUTER ASSISTED PROCEDURE OF HEAD AND NECK REGION, WITH COMPUTERIZED TOMOGRAPHY: ICD-10-PCS | Performed by: NEUROLOGICAL SURGERY

## 2018-11-06 PROCEDURE — 8E09XBH COMPUTER ASSISTED PROCEDURE OF HEAD AND NECK REGION, WITH MAGNETIC RESONANCE IMAGING: ICD-10-PCS | Performed by: NEUROLOGICAL SURGERY

## 2018-11-06 PROCEDURE — 88307 TISSUE EXAM BY PATHOLOGIST: CPT | Mod: 26 | Performed by: NEUROLOGICAL SURGERY

## 2018-11-06 PROCEDURE — 88342 IMHCHEM/IMCYTCHM 1ST ANTB: CPT | Performed by: NEUROLOGICAL SURGERY

## 2018-11-06 PROCEDURE — 20000003 ZZH R&B ICU

## 2018-11-06 PROCEDURE — 25000131 ZZH RX MED GY IP 250 OP 636 PS 637: Performed by: PHYSICIAN ASSISTANT

## 2018-11-06 PROCEDURE — 70553 MRI BRAIN STEM W/O & W/DYE: CPT

## 2018-11-06 PROCEDURE — 25000132 ZZH RX MED GY IP 250 OP 250 PS 637: Performed by: ANESTHESIOLOGY

## 2018-11-06 PROCEDURE — C9113 INJ PANTOPRAZOLE SODIUM, VIA: HCPCS | Performed by: PHYSICIAN ASSISTANT

## 2018-11-06 PROCEDURE — 25000301 ZZH OR RX SURGIFLO W/THROMBIN KIT 2ML 1991 OPNP: Performed by: NEUROLOGICAL SURGERY

## 2018-11-06 PROCEDURE — 36000073 ZZH SURGERY LEVEL 6 1ST 30 MIN: Performed by: NEUROLOGICAL SURGERY

## 2018-11-06 PROCEDURE — 40000170 ZZH STATISTIC PRE-PROCEDURE ASSESSMENT II: Performed by: NEUROLOGICAL SURGERY

## 2018-11-06 PROCEDURE — 61781 SCAN PROC CRANIAL INTRA: CPT | Performed by: NEUROLOGICAL SURGERY

## 2018-11-06 PROCEDURE — 88342 IMHCHEM/IMCYTCHM 1ST ANTB: CPT | Mod: 26 | Performed by: NEUROLOGICAL SURGERY

## 2018-11-06 PROCEDURE — 25000132 ZZH RX MED GY IP 250 OP 250 PS 637: Performed by: PHYSICIAN ASSISTANT

## 2018-11-06 PROCEDURE — 25000125 ZZHC RX 250: Performed by: ANESTHESIOLOGY

## 2018-11-06 PROCEDURE — 25000125 ZZHC RX 250: Performed by: NURSE ANESTHETIST, CERTIFIED REGISTERED

## 2018-11-06 PROCEDURE — 88331 PATH CONSLTJ SURG 1 BLK 1SPC: CPT | Performed by: NEUROLOGICAL SURGERY

## 2018-11-06 PROCEDURE — 25000566 ZZH SEVOFLURANE, EA 15 MIN: Performed by: NEUROLOGICAL SURGERY

## 2018-11-06 PROCEDURE — 88307 TISSUE EXAM BY PATHOLOGIST: CPT | Performed by: NEUROLOGICAL SURGERY

## 2018-11-06 PROCEDURE — 25000128 H RX IP 250 OP 636: Performed by: REGISTERED NURSE

## 2018-11-06 PROCEDURE — 25000125 ZZHC RX 250: Performed by: REGISTERED NURSE

## 2018-11-06 PROCEDURE — 86850 RBC ANTIBODY SCREEN: CPT | Performed by: NEUROLOGICAL SURGERY

## 2018-11-06 PROCEDURE — 86900 BLOOD TYPING SEROLOGIC ABO: CPT | Performed by: NEUROLOGICAL SURGERY

## 2018-11-06 PROCEDURE — 36000075 ZZH SURGERY LEVEL 6 EA 15 ADDTL MIN: Performed by: NEUROLOGICAL SURGERY

## 2018-11-06 PROCEDURE — 37000009 ZZH ANESTHESIA TECHNICAL FEE, EACH ADDTL 15 MIN: Performed by: NEUROLOGICAL SURGERY

## 2018-11-06 PROCEDURE — 37000008 ZZH ANESTHESIA TECHNICAL FEE, 1ST 30 MIN: Performed by: NEUROLOGICAL SURGERY

## 2018-11-06 PROCEDURE — 88331 PATH CONSLTJ SURG 1 BLK 1SPC: CPT | Mod: 26 | Performed by: NEUROLOGICAL SURGERY

## 2018-11-06 PROCEDURE — 25000128 H RX IP 250 OP 636: Performed by: NURSE ANESTHETIST, CERTIFIED REGISTERED

## 2018-11-06 PROCEDURE — 81025 URINE PREGNANCY TEST: CPT | Performed by: ANESTHESIOLOGY

## 2018-11-06 PROCEDURE — 62165 REMOVE PITUIT TUMOR W/SCOPE: CPT | Performed by: NEUROLOGICAL SURGERY

## 2018-11-06 PROCEDURE — 27211024 ZZHC OR SUPPLY OTHER OPNP: Performed by: NEUROLOGICAL SURGERY

## 2018-11-06 PROCEDURE — 0GB00ZZ EXCISION OF PITUITARY GLAND, OPEN APPROACH: ICD-10-PCS | Performed by: NEUROLOGICAL SURGERY

## 2018-11-06 PROCEDURE — 00000146 ZZHCL STATISTIC GLUCOSE BY METER IP

## 2018-11-06 PROCEDURE — 88313 SPECIAL STAINS GROUP 2: CPT | Performed by: NEUROLOGICAL SURGERY

## 2018-11-06 PROCEDURE — 86901 BLOOD TYPING SEROLOGIC RH(D): CPT | Performed by: NEUROLOGICAL SURGERY

## 2018-11-06 PROCEDURE — 25000128 H RX IP 250 OP 636: Performed by: PHYSICIAN ASSISTANT

## 2018-11-06 PROCEDURE — 25000128 H RX IP 250 OP 636: Performed by: NEUROLOGICAL SURGERY

## 2018-11-06 PROCEDURE — 88313 SPECIAL STAINS GROUP 2: CPT | Mod: 26 | Performed by: NEUROLOGICAL SURGERY

## 2018-11-06 DEVICE — SEALANT DURA SEAL 5ML 20-2050: Type: IMPLANTABLE DEVICE | Site: SINUS | Status: FUNCTIONAL

## 2018-11-06 DEVICE — GRAFT DURAGEN 1X1" ID-110: Type: IMPLANTABLE DEVICE | Site: SINUS | Status: FUNCTIONAL

## 2018-11-06 RX ORDER — DEXAMETHASONE SODIUM PHOSPHATE 4 MG/ML
INJECTION, SOLUTION INTRA-ARTICULAR; INTRALESIONAL; INTRAMUSCULAR; INTRAVENOUS; SOFT TISSUE PRN
Status: DISCONTINUED | OUTPATIENT
Start: 2018-11-06 | End: 2018-11-06

## 2018-11-06 RX ORDER — AMOXICILLIN 250 MG
1 CAPSULE ORAL 2 TIMES DAILY
Status: DISCONTINUED | OUTPATIENT
Start: 2018-11-06 | End: 2018-11-08 | Stop reason: HOSPADM

## 2018-11-06 RX ORDER — SODIUM CHLORIDE, SODIUM LACTATE, POTASSIUM CHLORIDE, CALCIUM CHLORIDE 600; 310; 30; 20 MG/100ML; MG/100ML; MG/100ML; MG/100ML
INJECTION, SOLUTION INTRAVENOUS CONTINUOUS
Status: DISCONTINUED | OUTPATIENT
Start: 2018-11-06 | End: 2018-11-06 | Stop reason: ALTCHOICE

## 2018-11-06 RX ORDER — LABETALOL HYDROCHLORIDE 5 MG/ML
10-40 INJECTION, SOLUTION INTRAVENOUS EVERY 10 MIN PRN
Status: DISCONTINUED | OUTPATIENT
Start: 2018-11-06 | End: 2018-11-07

## 2018-11-06 RX ORDER — METOCLOPRAMIDE 10 MG/1
10 TABLET ORAL EVERY 6 HOURS PRN
Status: DISCONTINUED | OUTPATIENT
Start: 2018-11-06 | End: 2018-11-08 | Stop reason: HOSPADM

## 2018-11-06 RX ORDER — OXYCODONE HYDROCHLORIDE 5 MG/1
5-15 TABLET ORAL
Status: DISCONTINUED | OUTPATIENT
Start: 2018-11-06 | End: 2018-11-06 | Stop reason: DRUGHIGH

## 2018-11-06 RX ORDER — ONDANSETRON 4 MG/1
4 TABLET, ORALLY DISINTEGRATING ORAL EVERY 30 MIN PRN
Status: DISCONTINUED | OUTPATIENT
Start: 2018-11-06 | End: 2018-11-06

## 2018-11-06 RX ORDER — FENTANYL CITRATE 50 UG/ML
25-50 INJECTION, SOLUTION INTRAMUSCULAR; INTRAVENOUS
Status: DISCONTINUED | OUTPATIENT
Start: 2018-11-06 | End: 2018-11-06

## 2018-11-06 RX ORDER — DEXAMETHASONE SODIUM PHOSPHATE 4 MG/ML
4 INJECTION, SOLUTION INTRA-ARTICULAR; INTRALESIONAL; INTRAMUSCULAR; INTRAVENOUS; SOFT TISSUE EVERY 6 HOURS
Status: DISCONTINUED | OUTPATIENT
Start: 2018-11-06 | End: 2018-11-07

## 2018-11-06 RX ORDER — HYDROMORPHONE HYDROCHLORIDE 1 MG/ML
.3-.6 INJECTION, SOLUTION INTRAMUSCULAR; INTRAVENOUS; SUBCUTANEOUS
Status: DISCONTINUED | OUTPATIENT
Start: 2018-11-06 | End: 2018-11-08 | Stop reason: HOSPADM

## 2018-11-06 RX ORDER — NEOSTIGMINE METHYLSULFATE 1 MG/ML
VIAL (ML) INJECTION PRN
Status: DISCONTINUED | OUTPATIENT
Start: 2018-11-06 | End: 2018-11-06

## 2018-11-06 RX ORDER — CEFAZOLIN SODIUM 2 G/100ML
2 INJECTION, SOLUTION INTRAVENOUS
Status: COMPLETED | OUTPATIENT
Start: 2018-11-06 | End: 2018-11-06

## 2018-11-06 RX ORDER — SODIUM CHLORIDE 9 MG/ML
INJECTION, SOLUTION INTRAVENOUS CONTINUOUS
Status: DISCONTINUED | OUTPATIENT
Start: 2018-11-06 | End: 2018-11-08 | Stop reason: HOSPADM

## 2018-11-06 RX ORDER — DEXAMETHASONE 4 MG/1
4 TABLET ORAL EVERY 6 HOURS
Status: DISCONTINUED | OUTPATIENT
Start: 2018-11-06 | End: 2018-11-07

## 2018-11-06 RX ORDER — ACETAMINOPHEN 325 MG/1
650 TABLET ORAL EVERY 4 HOURS PRN
Status: DISCONTINUED | OUTPATIENT
Start: 2018-11-09 | End: 2018-11-08 | Stop reason: HOSPADM

## 2018-11-06 RX ORDER — PROCHLORPERAZINE MALEATE 10 MG
10 TABLET ORAL EVERY 6 HOURS PRN
Status: DISCONTINUED | OUTPATIENT
Start: 2018-11-06 | End: 2018-11-08 | Stop reason: HOSPADM

## 2018-11-06 RX ORDER — CALCIUM CARBONATE 500 MG/1
1000 TABLET, CHEWABLE ORAL 4 TIMES DAILY PRN
Status: DISCONTINUED | OUTPATIENT
Start: 2018-11-06 | End: 2018-11-08 | Stop reason: HOSPADM

## 2018-11-06 RX ORDER — METOCLOPRAMIDE HYDROCHLORIDE 5 MG/ML
10 INJECTION INTRAMUSCULAR; INTRAVENOUS EVERY 6 HOURS PRN
Status: DISCONTINUED | OUTPATIENT
Start: 2018-11-06 | End: 2018-11-08 | Stop reason: HOSPADM

## 2018-11-06 RX ORDER — ONDANSETRON 4 MG/1
4 TABLET, ORALLY DISINTEGRATING ORAL EVERY 6 HOURS PRN
Status: DISCONTINUED | OUTPATIENT
Start: 2018-11-06 | End: 2018-11-08 | Stop reason: HOSPADM

## 2018-11-06 RX ORDER — FENTANYL CITRATE 50 UG/ML
INJECTION, SOLUTION INTRAMUSCULAR; INTRAVENOUS PRN
Status: DISCONTINUED | OUTPATIENT
Start: 2018-11-06 | End: 2018-11-06

## 2018-11-06 RX ORDER — NALOXONE HYDROCHLORIDE 0.4 MG/ML
.1-.4 INJECTION, SOLUTION INTRAMUSCULAR; INTRAVENOUS; SUBCUTANEOUS
Status: DISCONTINUED | OUTPATIENT
Start: 2018-11-06 | End: 2018-11-08 | Stop reason: HOSPADM

## 2018-11-06 RX ORDER — ONDANSETRON 2 MG/ML
4 INJECTION INTRAMUSCULAR; INTRAVENOUS EVERY 6 HOURS PRN
Status: DISCONTINUED | OUTPATIENT
Start: 2018-11-06 | End: 2018-11-08 | Stop reason: HOSPADM

## 2018-11-06 RX ORDER — VECURONIUM BROMIDE 1 MG/ML
INJECTION, POWDER, LYOPHILIZED, FOR SOLUTION INTRAVENOUS PRN
Status: DISCONTINUED | OUTPATIENT
Start: 2018-11-06 | End: 2018-11-06

## 2018-11-06 RX ORDER — HYDRALAZINE HYDROCHLORIDE 20 MG/ML
2.5-5 INJECTION INTRAMUSCULAR; INTRAVENOUS EVERY 10 MIN PRN
Status: DISCONTINUED | OUTPATIENT
Start: 2018-11-06 | End: 2018-11-06

## 2018-11-06 RX ORDER — LIDOCAINE HYDROCHLORIDE 20 MG/ML
INJECTION, SOLUTION INFILTRATION; PERINEURAL PRN
Status: DISCONTINUED | OUTPATIENT
Start: 2018-11-06 | End: 2018-11-06

## 2018-11-06 RX ORDER — ALBUTEROL SULFATE 0.83 MG/ML
2.5 SOLUTION RESPIRATORY (INHALATION) EVERY 4 HOURS PRN
Status: DISCONTINUED | OUTPATIENT
Start: 2018-11-06 | End: 2018-11-06

## 2018-11-06 RX ORDER — ONDANSETRON 2 MG/ML
INJECTION INTRAMUSCULAR; INTRAVENOUS PRN
Status: DISCONTINUED | OUTPATIENT
Start: 2018-11-06 | End: 2018-11-06

## 2018-11-06 RX ORDER — HYDROMORPHONE HYDROCHLORIDE 1 MG/ML
.3-.5 INJECTION, SOLUTION INTRAMUSCULAR; INTRAVENOUS; SUBCUTANEOUS EVERY 5 MIN PRN
Status: DISCONTINUED | OUTPATIENT
Start: 2018-11-06 | End: 2018-11-06

## 2018-11-06 RX ORDER — ACETAMINOPHEN 325 MG/1
975 TABLET ORAL EVERY 8 HOURS
Status: DISCONTINUED | OUTPATIENT
Start: 2018-11-06 | End: 2018-11-08 | Stop reason: HOSPADM

## 2018-11-06 RX ORDER — CEFAZOLIN SODIUM 1 G/3ML
INJECTION, POWDER, FOR SOLUTION INTRAMUSCULAR; INTRAVENOUS PRN
Status: DISCONTINUED | OUTPATIENT
Start: 2018-11-06 | End: 2018-11-06

## 2018-11-06 RX ORDER — HYDROXYZINE HYDROCHLORIDE 25 MG/1
25 TABLET, FILM COATED ORAL EVERY 6 HOURS PRN
Status: DISCONTINUED | OUTPATIENT
Start: 2018-11-06 | End: 2018-11-08 | Stop reason: HOSPADM

## 2018-11-06 RX ORDER — GINSENG 100 MG
CAPSULE ORAL PRN
Status: DISCONTINUED | OUTPATIENT
Start: 2018-11-06 | End: 2018-11-06 | Stop reason: HOSPADM

## 2018-11-06 RX ORDER — EPINEPHRINE 1 MG/ML
INJECTION, SOLUTION INTRAMUSCULAR; SUBCUTANEOUS PRN
Status: DISCONTINUED | OUTPATIENT
Start: 2018-11-06 | End: 2018-11-06 | Stop reason: HOSPADM

## 2018-11-06 RX ORDER — HYDRALAZINE HYDROCHLORIDE 20 MG/ML
10-20 INJECTION INTRAMUSCULAR; INTRAVENOUS EVERY 30 MIN PRN
Status: DISCONTINUED | OUTPATIENT
Start: 2018-11-06 | End: 2018-11-08 | Stop reason: HOSPADM

## 2018-11-06 RX ORDER — PROPOFOL 10 MG/ML
INJECTION, EMULSION INTRAVENOUS PRN
Status: DISCONTINUED | OUTPATIENT
Start: 2018-11-06 | End: 2018-11-06

## 2018-11-06 RX ORDER — AMOXICILLIN 250 MG
2 CAPSULE ORAL 2 TIMES DAILY
Status: DISCONTINUED | OUTPATIENT
Start: 2018-11-06 | End: 2018-11-08 | Stop reason: HOSPADM

## 2018-11-06 RX ORDER — ONDANSETRON 2 MG/ML
4 INJECTION INTRAMUSCULAR; INTRAVENOUS EVERY 30 MIN PRN
Status: DISCONTINUED | OUTPATIENT
Start: 2018-11-06 | End: 2018-11-06

## 2018-11-06 RX ORDER — LIDOCAINE 40 MG/G
CREAM TOPICAL
Status: DISCONTINUED | OUTPATIENT
Start: 2018-11-06 | End: 2018-11-08 | Stop reason: HOSPADM

## 2018-11-06 RX ORDER — SODIUM CHLORIDE, SODIUM LACTATE, POTASSIUM CHLORIDE, CALCIUM CHLORIDE 600; 310; 30; 20 MG/100ML; MG/100ML; MG/100ML; MG/100ML
INJECTION, SOLUTION INTRAVENOUS CONTINUOUS PRN
Status: DISCONTINUED | OUTPATIENT
Start: 2018-11-06 | End: 2018-11-06

## 2018-11-06 RX ORDER — NALOXONE HYDROCHLORIDE 0.4 MG/ML
.1-.4 INJECTION, SOLUTION INTRAMUSCULAR; INTRAVENOUS; SUBCUTANEOUS
Status: DISCONTINUED | OUTPATIENT
Start: 2018-11-06 | End: 2018-11-06

## 2018-11-06 RX ORDER — GLYCOPYRROLATE 0.2 MG/ML
INJECTION, SOLUTION INTRAMUSCULAR; INTRAVENOUS PRN
Status: DISCONTINUED | OUTPATIENT
Start: 2018-11-06 | End: 2018-11-06

## 2018-11-06 RX ORDER — LIDOCAINE HYDROCHLORIDE AND EPINEPHRINE 10; 10 MG/ML; UG/ML
INJECTION, SOLUTION INFILTRATION; PERINEURAL PRN
Status: DISCONTINUED | OUTPATIENT
Start: 2018-11-06 | End: 2018-11-06 | Stop reason: HOSPADM

## 2018-11-06 RX ORDER — OXYCODONE HYDROCHLORIDE 5 MG/1
5-10 TABLET ORAL
Status: DISCONTINUED | OUTPATIENT
Start: 2018-11-06 | End: 2018-11-08 | Stop reason: HOSPADM

## 2018-11-06 RX ORDER — CEFAZOLIN SODIUM 1 G/3ML
1 INJECTION, POWDER, FOR SOLUTION INTRAMUSCULAR; INTRAVENOUS SEE ADMIN INSTRUCTIONS
Status: DISCONTINUED | OUTPATIENT
Start: 2018-11-06 | End: 2018-11-06

## 2018-11-06 RX ORDER — BUPROPION HYDROCHLORIDE 150 MG/1
300 TABLET ORAL DAILY
Status: DISCONTINUED | OUTPATIENT
Start: 2018-11-07 | End: 2018-11-08 | Stop reason: HOSPADM

## 2018-11-06 RX ORDER — LEVOTHYROXINE SODIUM 88 UG/1
88 TABLET ORAL DAILY
Status: DISCONTINUED | OUTPATIENT
Start: 2018-11-06 | End: 2018-11-08 | Stop reason: HOSPADM

## 2018-11-06 RX ORDER — OXYCODONE HCL 10 MG/1
10 TABLET, FILM COATED, EXTENDED RELEASE ORAL EVERY 12 HOURS
Status: DISCONTINUED | OUTPATIENT
Start: 2018-11-06 | End: 2018-11-06

## 2018-11-06 RX ADMIN — OXYCODONE HYDROCHLORIDE 10 MG: 5 TABLET ORAL at 21:14

## 2018-11-06 RX ADMIN — PROPOFOL 50 MG: 10 INJECTION, EMULSION INTRAVENOUS at 15:02

## 2018-11-06 RX ADMIN — VECURONIUM BROMIDE 2 MG: 1 INJECTION, POWDER, LYOPHILIZED, FOR SOLUTION INTRAVENOUS at 15:28

## 2018-11-06 RX ADMIN — CEFAZOLIN SODIUM 2 G: 2 INJECTION, SOLUTION INTRAVENOUS at 14:45

## 2018-11-06 RX ADMIN — LEVOTHYROXINE SODIUM 88 MCG: 88 TABLET ORAL at 21:11

## 2018-11-06 RX ADMIN — PANTOPRAZOLE SODIUM 40 MG: 40 INJECTION, POWDER, FOR SOLUTION INTRAVENOUS at 18:48

## 2018-11-06 RX ADMIN — ACETAMINOPHEN 975 MG: 325 TABLET, FILM COATED ORAL at 21:12

## 2018-11-06 RX ADMIN — OXYCODONE HYDROCHLORIDE 10 MG: 10 TABLET, FILM COATED, EXTENDED RELEASE ORAL at 13:20

## 2018-11-06 RX ADMIN — PROPOFOL 250 MG: 10 INJECTION, EMULSION INTRAVENOUS at 14:38

## 2018-11-06 RX ADMIN — PROCHLORPERAZINE EDISYLATE 10 MG: 5 INJECTION INTRAMUSCULAR; INTRAVENOUS at 23:42

## 2018-11-06 RX ADMIN — Medication 0.5 MG: at 17:44

## 2018-11-06 RX ADMIN — PROPOFOL 20 MG: 10 INJECTION, EMULSION INTRAVENOUS at 15:41

## 2018-11-06 RX ADMIN — CEFAZOLIN 1 G: 1 INJECTION, POWDER, FOR SOLUTION INTRAMUSCULAR; INTRAVENOUS at 16:40

## 2018-11-06 RX ADMIN — Medication 0.5 MG: at 18:34

## 2018-11-06 RX ADMIN — PROPOFOL 50 MG: 10 INJECTION, EMULSION INTRAVENOUS at 15:25

## 2018-11-06 RX ADMIN — Medication 0.5 MG: at 17:32

## 2018-11-06 RX ADMIN — REMIFENTANIL HYDROCHLORIDE 0.15 MCG/KG/MIN: 1 INJECTION, POWDER, LYOPHILIZED, FOR SOLUTION INTRAVENOUS at 14:47

## 2018-11-06 RX ADMIN — SODIUM CHLORIDE, POTASSIUM CHLORIDE, SODIUM LACTATE AND CALCIUM CHLORIDE: 600; 310; 30; 20 INJECTION, SOLUTION INTRAVENOUS at 15:37

## 2018-11-06 RX ADMIN — ONDANSETRON 4 MG: 2 INJECTION INTRAMUSCULAR; INTRAVENOUS at 16:33

## 2018-11-06 RX ADMIN — MIDAZOLAM 2 MG: 1 INJECTION INTRAMUSCULAR; INTRAVENOUS at 14:31

## 2018-11-06 RX ADMIN — FENTANYL CITRATE 100 MCG: 50 INJECTION, SOLUTION INTRAMUSCULAR; INTRAVENOUS at 14:38

## 2018-11-06 RX ADMIN — LIDOCAINE HYDROCHLORIDE 0.5 ML: 10 INJECTION, SOLUTION EPIDURAL; INFILTRATION; INTRACAUDAL; PERINEURAL at 12:22

## 2018-11-06 RX ADMIN — SODIUM CHLORIDE, POTASSIUM CHLORIDE, SODIUM LACTATE AND CALCIUM CHLORIDE: 600; 310; 30; 20 INJECTION, SOLUTION INTRAVENOUS at 12:22

## 2018-11-06 RX ADMIN — NEOSTIGMINE METHYLSULFATE 4 MG: 1 INJECTION, SOLUTION INTRAVENOUS at 16:50

## 2018-11-06 RX ADMIN — DEXAMETHASONE SODIUM PHOSPHATE 10 MG: 4 INJECTION, SOLUTION INTRA-ARTICULAR; INTRALESIONAL; INTRAMUSCULAR; INTRAVENOUS; SOFT TISSUE at 15:05

## 2018-11-06 RX ADMIN — HYDROMORPHONE HYDROCHLORIDE 0.3 MG: 1 INJECTION, SOLUTION INTRAMUSCULAR; INTRAVENOUS; SUBCUTANEOUS at 22:09

## 2018-11-06 RX ADMIN — SODIUM CHLORIDE: 9 INJECTION, SOLUTION INTRAVENOUS at 18:36

## 2018-11-06 RX ADMIN — VECURONIUM BROMIDE 2 MG: 1 INJECTION, POWDER, LYOPHILIZED, FOR SOLUTION INTRAVENOUS at 16:16

## 2018-11-06 RX ADMIN — SODIUM CHLORIDE, POTASSIUM CHLORIDE, SODIUM LACTATE AND CALCIUM CHLORIDE: 600; 310; 30; 20 INJECTION, SOLUTION INTRAVENOUS at 14:50

## 2018-11-06 RX ADMIN — GLYCOPYRROLATE 0.6 MG: 0.2 INJECTION, SOLUTION INTRAMUSCULAR; INTRAVENOUS at 16:50

## 2018-11-06 RX ADMIN — ROCURONIUM BROMIDE 50 MG: 10 INJECTION INTRAVENOUS at 14:39

## 2018-11-06 RX ADMIN — MIDAZOLAM 2 MG: 1 INJECTION INTRAMUSCULAR; INTRAVENOUS at 13:20

## 2018-11-06 RX ADMIN — LIDOCAINE HYDROCHLORIDE 100 MG: 20 INJECTION, SOLUTION INFILTRATION; PERINEURAL at 14:38

## 2018-11-06 RX ADMIN — HYDROMORPHONE HYDROCHLORIDE 0.5 MG: 1 INJECTION, SOLUTION INTRAMUSCULAR; INTRAVENOUS; SUBCUTANEOUS at 16:30

## 2018-11-06 RX ADMIN — DEXAMETHASONE 4 MG: 4 TABLET ORAL at 21:12

## 2018-11-06 ASSESSMENT — PAIN DESCRIPTION - DESCRIPTORS
DESCRIPTORS: HEADACHE
DESCRIPTORS: HEADACHE

## 2018-11-06 ASSESSMENT — ENCOUNTER SYMPTOMS
DYSRHYTHMIAS: 0
SEIZURES: 0

## 2018-11-06 ASSESSMENT — LIFESTYLE VARIABLES: TOBACCO_USE: 0

## 2018-11-06 ASSESSMENT — ACTIVITIES OF DAILY LIVING (ADL): ADLS_ACUITY_SCORE: 10

## 2018-11-06 NOTE — OP NOTE
DATE OF PROCEDURE:  11/6/2018      SURGEON:  Mervin Pack MD       ASSISTANT:  Nitin Hurt PA-C   Note, Nitin Hurt was present for and assisted with the entire surgery.  His role as an assistant was crucial for his aid in positioning, suctioning, direction of the camera, and closure.       PREOPERATIVE DIAGNOSIS:  Suprasellar mass.       POSTOPERATIVE DIAGNOSIS:  Suprasellar mass.       PROCEDURES:   1.  Endoscopic endonasal transsphenoidal resection of suprasellar mass.   2.  Use of Medtronic Stealth navigation with MRI and CT guidance.       ESTIMATED BLOOD LOSS:  50 mL.       INDICATIONS:  37-year-old female with menstrual irregularities, found on work-up to have an expansile suprasellar mass abutting the optic chiasm.  We discussed the option of surgery given the suprasellar extension and proximity to the optic chiasm.  Risks, benefits, indications and alternatives were discussed with the patient and her family in detail.  All of their questions were answered, and they wished to proceed with surgery.       DESCRIPTION OF PROCEDURE:  The patient was positioned supine in pins.  Our sterile prepping and draping procedures were performed.  Medtronic Stealth navigation was registered with CT and MR guidance.  Antibiotics were administered and timeout was performed.  The endoscope was used to enter the right nostril and the Metzenbaum scissors were used to remove the right middle turbinate.  Hemostasis was achieved.  Subsequently, navigation was used to localize the right sphenoid ostium and the Kerrison rongeurs were used to widen rostrum on this side.  The [place] was used to dissect the contralateral rostrum across the midline and the Kerrison rongeurs were then used to remove the entirety of the sphenoid rostrum.  The pituitary rongeurs were then used to remove the sphenoid septum and the Blakesley forceps were used to remove the mucosa of the sphenoid sinus.  In this manner, the sella was exposed.  Navigation was  used to identify the borders of the sella and to identify critical structures, including the carotid arteries and optic nerves.  A 4 mm drill bit was used to drill the sella, and the retractable knife was used to open the dura of the sella.  Tumor was expressed with the ring forceps, and both frozen and permanent sections were sent to pathology.  Using a combination of a ring forceps and suction the entire visible mass was removed, and the diaphragm sella descended down into view of the surgical field.  The normal pituitary gland was visualized as well.  Hemostasis was achieved with Surgiflo and Gelfoam.  The dural defect was covered with Duragen, and fixed into place with DuraSeal glue.  Final hemostasis was achieved, and a moustache dressing was applied.  There were no intraprocedural complications.

## 2018-11-06 NOTE — PROGRESS NOTES
Admission medication history interview status for the 11/6/2018  admission is complete. See EPIC admission navigator for prior to admission medications     Medication history source reliability:Moderate    Medication history interview source(s):Patient    Medication history resources (including written lists, pill bottles, clinic record):Verified all medications with pharmacy    Primary pharmacy.[facility]    Additional medication history information not noted on PTA med list :None    Time spent in this activity: 40 minutes    Prior to Admission medications    Medication Sig Last Dose Taking? Auth Provider   Amphetamine-Dextroamphetamine (ADDERALL PO) Take 20-40 mg by mouth 2 times daily as needed (Never take after 15:00) 11/5/2018 at 0600 Yes Reported, Patient   BuPROPion HCl (WELLBUTRIN XL PO) Take 300 mg by mouth daily 11/5/2018 at AM Yes Reported, Patient   IBUPROFEN PO Take 400-800 mg by mouth every 4 hours as needed for moderate pain 11/1/2018 at PRN Yes Reported, Patient   LEVOTHYROXINE SODIUM PO Take 88 mcg by mouth daily 11/5/2018 at AM Yes Reported, Patient

## 2018-11-06 NOTE — IP AVS SNAPSHOT
[address] [facility]    [address]    [place] [address]    Phone:  [phone]                                       After Visit Summary   11/6/2018    Catarino James    MRN: [id number]           After Visit Summary Signature Page     I have received my discharge instructions, and my questions have been answered. I have discussed any challenges I see with this plan with the nurse or doctor.    ..........................................................................................................................................  Patient/Patient Representative Signature      ..........................................................................................................................................  Patient Representative Print Name and Relationship to Patient    ..................................................               ................................................  Date                                   Time    ..........................................................................................................................................  Reviewed by Signature/Title    ...................................................              ..............................................  Date                                               Time          22EPIC Rev 08/18

## 2018-11-06 NOTE — ANESTHESIA CARE TRANSFER NOTE
Patient: Catarino James    Procedure(s):  ENDOSCOPE ENDONASAL RESECTION OF SUPRACELLAR MASS    Diagnosis: SUPRASELLAR MASS  Diagnosis Additional Information: No value filed.    Anesthesia Type:   General, ETT     Note:  Airway :Face Mask  Patient transferred to:PACU  Comments: Transferred to PACU, spontaneous respirations, 10L oxygen via facemask.  All monitors and alarms on and functioning, VSS.  Patient awake, comfortable.  Report to PACU RN.Handoff Report: Identifed the Patient, Identified the Reponsible Provider, Reviewed the pertinent medical history, Discussed the surgical course, Reviewed Intra-OP anesthesia mangement and issues during anesthesia, Set expectations for post-procedure period and Allowed opportunity for questions and acknowledgement of understanding      Vitals: (Last set prior to Anesthesia Care Transfer)    CRNA VITALS  11/6/2018 1640 - 11/6/2018 1716      11/6/2018             NIBP: (!)  141/96    Pulse: 94    NIBP Mean: 111    SpO2: 99 %    Resp Rate (set): 10                Electronically Signed By: VIRAL Concepcion CRNA  November 6, 2018  5:16 PM

## 2018-11-06 NOTE — IP AVS SNAPSHOT
MRN:[id number]                      After Visit Summary   11/6/2018    Catarino James    MRN: [id number]           Thank you!     Thank you for choosing [facility] for your care. Our goal is always to provide you with excellent care. Hearing back from our patients is one way we can continue to improve our services. Please take a few minutes to complete the written survey that you may receive in the mail after you visit with us. Thank you!        Patient Information     Date Of Birth          1980        Designated Caregiver       Most Recent Value    Caregiver    Will someone help with your care after discharge? yes    Name of designated caregiver Micheal Dennis    Phone number of caregiver cell  [phone]    Caregiver address [address]  [facility]      About your hospital stay     You were admitted on:  November 6, 2018 You last received care in the:  [place] [address] [facility] [place]    You were discharged on:  November 8, 2018        Reason for your hospital stay       Pituitary adenoma resection                  Who to Call     For medical emergencies, please call 911.  For non-urgent questions about your medical care, please call your primary care provider or clinic, [phone]  For questions related to your surgery, please call your surgery clinic        Attending Provider     Provider Specialty    Mervin Pack MD Neurosurgery       Primary Care Provider Office Phone # Fax #    Katalina Muñoz [phone]      After Care Instructions     Activity       Your activity upon discharge: activity as tolerated            Diet       Follow this diet upon discharge: Regular            Discharge Instructions       Discharge instructions:  No lifting of more than 10 pounds until follow up visit.  Ok to shampoo hair, shower or bathe, but, do not scrub or submerge incision under water until evaluated post operatively in clinic.    Ok to walk  as tolerated, avoid bedrest.    No contact sports until after follow up visit  No high impact activities such as; running/jogging, snowmobile or 4 dolan riding or any other recreational vehicles    Call my office at [phone] for increasing redness, swelling or pus draining from the incision, increased pain or any other questions and concerns.    Go to ER with any seizure activity, mental status change (increasing confusion), difficulty with speech or increasing or acute weakness                  Follow-up Appointments     Follow-up and recommended labs and tests        F/u 11/19/18 with Dr. Pack for debridement                  Your next 10 appointments already scheduled     Jan 02, 2019 12:00 PM CST   Return Visit with Virginia Romero PA-C   [facility] ([facility])    [address] [place] [id number] [phone]            Feb 18, 2019 12:30 PM CST   Return Visit with Mervin Pack MD   [facility] ([facility])    [address] [place] [id number] [phone]              Further instructions from your care team       [facility]  Dr. Pack Discharge Instructions Following Brain Surgery  [phone]  Monday-Friday; 8:00 AM - 4:00 PM    Bowel Care:  Because narcotics and inactivity can cause constipation, you need to watch your bowel habits.  Plenty of fluid, high fiber foods, walking, and stool softeners are important.  If you feel bloated and /or stop passing gas we recommend prune juice or over the counter oral laxatives and suppositories.  Do not strain with your bowel movements.    Medications:  Pain management is unique to all patients.  We will try to keep your pain under control after surgery and maximize your comfort as much as the medications will allow.  You will generally be given  medications for pain and for  constipation.  You may be given a prescription to prevent seizures.  You may also be given a steroid (Decadron) and then something to protect your stomach (such as, Pepcid).  It is important that you use these as prescribed.  Please remember to bring your pill bottles to all of your appointments.   If you require a refill, you need to allow 3 business days to give time to review medication needs and call you if needed.  Some narcotic prescriptions need to be picked up or mailed to your pharmacy. DO NOT suddenly stop medication used for seizures unless directed by your provider.  Avoid alcohol beverages while taking medications.   You can use ice to areas of pain as needed, 20 minutes at a time.   Activity:   You are encouraged to walk: start with short walks 5-10 minutes at a time for 4-5 times per day and increase as tolerated.  Some days you will have more energy than others, this is normal.  Stair climbing as tolerated, we recommend you use the railing. No lifting greater than 10 pounds:  approximately equal to one gallon of milk.  No straining or strenuous activity.  No bending your head down below your heart.     Driving:  No driving until cleared by your provider.  Ask your provider at your post op visit if it is ok for you to drive.  If you have had a seizure, by law, you cannot drive for 6 months and have remained seizure free.  You may ride in a car as tolerated.     Nutrition:  In general, your diet restrictions will not change with your surgery.  You may need to eat small frequent meals initially until your appetite returns.  Eat plenty of high fiber foods and drink plenty of fluids.   If you were discharged with a fluid restriction, drink juices, sodas, Gatorade and vegetable juices.    Call your doctor or go to the nearest Emergency Room if these occur:  Drainage from your nose, increased pain/redness/swelling, temperatures greater than 101.5, leg pain or swelling, unrelieved headaches, new changes from  "your baseline (vison, movement, sedation, or increased confusion - your family may notice these changes before you notice these changes), or seizure activity        Pending Results     Date and Time Order Name Status Description    2018 0000 IgF binding protein 1 In process     2018 1621 Surgical pathology exam In process             Statement of Approval     Ordered          18 1019  I have reviewed and agree with all the recommendations and orders detailed in this document.  EFFECTIVE NOW     Approved and electronically signed by:  Nitin Hurt PA-C             Admission Information     Date & Time Provider Department Dept. Phone    2018 Mervin Pack MD [address] [facility] [phone]      Your Vitals Were     Blood Pressure Pulse Temperature Respirations Height Weight    136/82 (BP Location: Right arm) 93 98.1  F (36.7  C) (Oral) 16 1.676 m (5' 6\") 85.7 kg (189 lb)    Last Period Pulse Oximetry BMI (Body Mass Index)             2018 (Exact Date) 98% 30.51 kg/m2         MyChart Information     [employer] lets you send messages to your doctor, view your test results, renew your prescriptions, schedule appointments and more. To sign up, go to [email or web address] . Click on \"Log in\" on the left side of the screen, which will take you to the Welcome page. Then click on \"Sign up Now\" on the right side of the page.     You will be asked to enter the access code listed below, as well as some personal information. Please follow the directions to create your username and password.     Your access code is: [email or web address]  Expires: 2019 12:24 PM     Your access code will  in 90 days. If you need help or a new code, please call your [facility] clinic or [phone].        Care EveryWhere ID     This is your Care EveryWhere ID. This could be used by other organizations to access your [facility] medical records  [id number]        Equal Access to Services     " DAVID [facility] CenterYESSI : Hadii aad ku indira Burr, waaxda luqadaha, qaybta kaalmada adeedwin, shwetha ivan haydeni patriciokingsleycy fowler . So [facility] [phone].    ATENCIÓN: Si habla español, tiene a fish disposición servicios gratuitos de asistencia lingüística. Llame al [phone].    We comply with applicable federal civil rights laws and [place] laws. We do not discriminate on the basis of race, color, national origin, age, disability, sex, sexual orientation, or gender identity.               Review of your medicines      START taking        Dose / Directions    hydrocortisone 10 MG tablet   Commonly known as:  CORTEF   Notes to Patient:  50mg on 11/8 in PM, 50mg on 11/9 in AM and PM, 50mg on 11/10 in AM and PM, 25mg on 11/11 in AM and PM, 25mg on 11/12 in AM and PM, 25mg on 11/13 in AM        Dose:  50 mg   Take 5 tablets (50 mg) by mouth daily Starting Friday 50 mg twice daily for 2 days then 25 mg twice daily for 2 days then 25 mg once daily   Quantity:  31 tablet   Refills:  0       oxyCODONE IR 5 MG tablet   Commonly known as:  ROXICODONE        Dose:  5-10 mg   Take 1-2 tablets (5-10 mg) by mouth every 3 hours as needed   Quantity:  15 tablet   Refills:  0         CONTINUE these medicines which have NOT CHANGED        Dose / Directions    ADDERALL PO        Dose:  20-40 mg   Take 20-40 mg by mouth 2 times daily as needed (Never take after 15:00)   Refills:  0       LEVOTHYROXINE SODIUM PO        Dose:  88 mcg   Take 88 mcg by mouth daily   Refills:  0       WELLBUTRIN XL PO        Dose:  300 mg   Take 300 mg by mouth daily   Refills:  0            Where to get your medicines      These medications were sent to [facility] - [address]  [address] [id number]     Phone:  [phone]     hydrocortisone 10 MG tablet         Some of these will need a paper prescription and others can be bought over the counter. Ask your nurse if you have questions.     Bring  a paper prescription for each of these medications     oxyCODONE IR 5 MG tablet                Protect others around you: Learn how to safely use, store and throw away your medicines at www.disposemymeds.org.        Information about OPIOIDS     PRESCRIPTION OPIOIDS: WHAT YOU NEED TO KNOW   We gave you an opioid (narcotic) pain medicine. It is important to manage your pain, but opioids are not always the best choice. You should first try all the other options your care team gave you. Take this medicine for as short a time (and as few doses) as possible.    Some activities can increase your pain, such as bandage changes or therapy sessions. It may help to take your pain medicine 30 to 60 minutes before these activities. Reduce your stress by getting enough sleep, working on hobbies you enjoy and practicing relaxation or meditation. Talk to your care team about ways to manage your pain beyond prescription opioids.    These medicines have risks:    DO NOT drive when on new or higher doses of pain medicine. These medicines can affect your alertness and reaction times, and you could be arrested for driving under the influence (DUI). If you need to use opioids long-term, talk to your care team about driving.    DO NOT operate heavy machinery    DO NOT do any other dangerous activities while taking these medicines.    DO NOT drink any alcohol while taking these medicines.     If the opioid prescribed includes acetaminophen, DO NOT take with any other medicines that contain acetaminophen. Read all labels carefully. Look for the word  acetaminophen  or  Tylenol.  Ask your pharmacist if you have questions or are unsure.    You can get addicted to pain medicines, especially if you have a history of addiction (chemical, alcohol or substance dependence). Talk to your care team about ways to reduce this risk.    All opioids tend to cause constipation. Drink plenty of water and eat foods that have a lot of fiber, such as fruits,  vegetables, prune juice, apple juice and high-fiber cereal. Take a laxative (Miralax, milk of magnesia, Colace, Senna) if you don t move your bowels at least every other day. Other side effects include upset stomach, sleepiness, dizziness, throwing up, tolerance (needing more of the medicine to have the same effect), physical dependence and slowed breathing.    Store your pills in a secure place, locked if possible. We will not replace any lost or stolen medicine. If you don t finish your medicine, please throw away (dispose) as directed by your pharmacist. The [place] Pollution Control Agency has more information about safe disposal: https://www.pca.[facility].mn.us/living-green/managing-unwanted-medications             Medication List: This is a list of all your medications and when to take them. Check marks below indicate your daily home schedule. Keep this list as a reference.      Medications           Morning Afternoon Evening Bedtime As Needed    ADDERALL PO   Take 20-40 mg by mouth 2 times daily as needed (Never take after 15:00)                                   hydrocortisone 10 MG tablet   Commonly known as:  CORTEF   Take 5 tablets (50 mg) by mouth daily Starting Friday 50 mg twice daily for 2 days then 25 mg twice daily for 2 days then 25 mg once daily   Notes to Patient:  50mg on 11/8 in PM, 50mg on 11/9 in AM and PM, 50mg on 11/10 in AM and PM, 25mg on 11/11 in AM and PM, 25mg on 11/12 in AM and PM, 25mg on 11/13 in AM                                      LEVOTHYROXINE SODIUM PO   Take 88 mcg by mouth daily   Last time this was given:  88 mcg on 11/8/2018  8:37 AM   Next Dose Due:  11/9 in AM                                   oxyCODONE IR 5 MG tablet   Commonly known as:  ROXICODONE   Take 1-2 tablets (5-10 mg) by mouth every 3 hours as needed   Last time this was given:  10 mg on 11/8/2018 12:50 PM   Next Dose Due:  Anytime after 3:50pm                                   WELLBUTRIN XL PO   Take 300 mg by  mouth daily   Last time this was given:  300 mg on 11/8/2018  8:37 AM   Next Dose Due:  11/9 in AM

## 2018-11-06 NOTE — ANESTHESIA PREPROCEDURE EVALUATION
Procedure: Procedure(s):  ENDOSCOPE ENDONASAL RESECTION OF SUPRACELLAR MASS(STEALTH,QUINTANA PINS,MIDAS HARRISON,LEICA MICROSCOPE)  Preop diagnosis: SUPRASELLAR MASS    No Known Allergies  Past Medical History:   Diagnosis Date     ADHD      Anemia      Anxiety      B12 deficiency      Depression      Hypothyroidism      Infertility, female      Obesity      Pituitary macroadenoma (H)      Thyroid disease      Varicella      Vitiligo      Past Surgical History:   Procedure Laterality Date     C C-SEC ONLY,PREV C-SEC       Prior to Admission medications    Medication Sig Start Date End Date Taking? Authorizing Provider   Amphetamine-Dextroamphetamine (ADDERALL PO) Take 20-40 mg by mouth 2 times daily as needed (Never take after 15:00)   Yes Reported, Patient   BuPROPion HCl (WELLBUTRIN XL PO) Take 300 mg by mouth daily   Yes Reported, Patient   IBUPROFEN PO Take 400-800 mg by mouth every 4 hours as needed for moderate pain   Yes Reported, Patient   LEVOTHYROXINE SODIUM PO Take 88 mcg by mouth daily   Yes Reported, Patient     No current Epic-ordered facility-administered medications on file.      No current Epic-ordered outpatient prescriptions on file.     Wt Readings from Last 1 Encounters:   10/22/18 88.8 kg (195 lb 12.8 oz)     Temp Readings from Last 1 Encounters:   01/10/18 36.9  C (98.4  F) (Oral)     BP Readings from Last 6 Encounters:   10/22/18 125/88   01/10/18 122/76   01/09/18 (!) 139/99   07/21/16 105/72   02/26/15 (!) 137/91   10/23/13 (!) 145/101     Pulse Readings from Last 4 Encounters:   10/22/18 90   01/10/18 65   07/20/16 92   10/23/13 102     Resp Readings from Last 1 Encounters:   01/10/18 16     SpO2 Readings from Last 1 Encounters:   10/22/18 98%     Recent Labs   Lab Test 07/02/18  01/10/18   1923  01/09/18   0306   NA   --   135  138   POTASSIUM   --   3.7  3.9   CHLORIDE   --   102  104   CO2   --   30  27   ANIONGAP   --   3  7   GLC  96  105*  84   BUN   --   8  11   CR   --   0.74  0.78    FAINA   --   8.3*  8.5     Recent Labs   Lab Test  01/10/18   1923  01/09/18   0306   WBC  5.2  5.7   HGB  14.0  14.8   PLT  342  357     RECENT LABS:   ECG:   ECHO:   CXR:        Anesthesia Evaluation     . Pt has had prior anesthetic. Type: General    No history of anesthetic complications          ROS/MED HX    ENT/Pulmonary:      (-) tobacco use, asthma and sleep apnea   Neurologic:     (+)other neuro pituitary adenoma   (-) seizures, CVA and migraines   Cardiovascular:        (-) hypertension, CAD, ZEE, arrhythmias, valvular problems/murmurs and dyslipidemia   METS/Exercise Tolerance:  >4 METS   Hematologic:     (+) Anemia, -     (-) history of blood clots and other hematologic disorder   Musculoskeletal:        (-) arthritis   GI/Hepatic:        (-) GERD and liver disease   Renal/Genitourinary:      (-) renal disease and nephrolithiasis   Endo:     (+) thyroid problem hypothyroidism, Obesity, .   (-) Type I DM and Type II DM   Psychiatric:     (+) psychiatric history anxiety and depression (ADHD)      Infectious Disease:        (-) Recent Fever   Malignancy:         Other:                     Physical Exam  Normal systems: cardiovascular, pulmonary and dental    Airway   Mallampati: II  TM distance: >3 FB  Neck ROM: full    Dental     Cardiovascular   Rhythm and rate: regular and normal  (-) no murmur    Pulmonary                     Anesthesia Plan      History & Physical Review      ASA Status:  2 .    NPO Status:  > 8 hours    Plan for General and ETT with Propofol induction. Maintenance will be Balanced.    PONV prophylaxis:  Ondansetron (or other 5HT-3) and Dexamethasone or Solumedrol  Additional equipment: 2nd IV 10 mg decadron  Remifentanil infusion      Postoperative Care  Postoperative pain management:  Multi-modal analgesia.      Consents  Anesthetic plan, risks, benefits and alternatives discussed with:  Patient..                          .

## 2018-11-07 LAB
CORTIS SERPL-MCNC: 24.5 UG/DL (ref 4–22)
FSH SERPL-ACNC: 80.9 IU/L
GH SERPL-MCNC: 1.4 UG/L
GLUCOSE BLDC GLUCOMTR-MCNC: 101 MG/DL (ref 70–99)
GLUCOSE BLDC GLUCOMTR-MCNC: 116 MG/DL (ref 70–99)
LH SERPL-ACNC: 36.6 IU/L
PROLACTIN SERPL-MCNC: 14 UG/L (ref 3–27)
T4 SERPL-MCNC: 6.6 UG/DL (ref 4.5–13.9)
TSH SERPL DL<=0.005 MIU/L-ACNC: 3.89 MU/L (ref 0.4–4)

## 2018-11-07 PROCEDURE — C9113 INJ PANTOPRAZOLE SODIUM, VIA: HCPCS | Performed by: PHYSICIAN ASSISTANT

## 2018-11-07 PROCEDURE — 25000128 H RX IP 250 OP 636: Performed by: PHYSICIAN ASSISTANT

## 2018-11-07 PROCEDURE — 25000131 ZZH RX MED GY IP 250 OP 636 PS 637: Performed by: PHYSICIAN ASSISTANT

## 2018-11-07 PROCEDURE — 00000146 ZZHCL STATISTIC GLUCOSE BY METER IP

## 2018-11-07 PROCEDURE — 82533 TOTAL CORTISOL: CPT | Performed by: PHYSICIAN ASSISTANT

## 2018-11-07 PROCEDURE — 25500064 ZZH RX 255 OP 636: Performed by: NEUROLOGICAL SURGERY

## 2018-11-07 PROCEDURE — A9585 GADOBUTROL INJECTION: HCPCS | Performed by: NEUROLOGICAL SURGERY

## 2018-11-07 PROCEDURE — 36415 COLL VENOUS BLD VENIPUNCTURE: CPT | Performed by: PHYSICIAN ASSISTANT

## 2018-11-07 PROCEDURE — 25000132 ZZH RX MED GY IP 250 OP 250 PS 637: Performed by: PHYSICIAN ASSISTANT

## 2018-11-07 PROCEDURE — 84403 ASSAY OF TOTAL TESTOSTERONE: CPT | Performed by: PHYSICIAN ASSISTANT

## 2018-11-07 PROCEDURE — 84146 ASSAY OF PROLACTIN: CPT | Performed by: PHYSICIAN ASSISTANT

## 2018-11-07 PROCEDURE — 84270 ASSAY OF SEX HORMONE GLOBUL: CPT | Performed by: PHYSICIAN ASSISTANT

## 2018-11-07 PROCEDURE — 84443 ASSAY THYROID STIM HORMONE: CPT | Performed by: PHYSICIAN ASSISTANT

## 2018-11-07 PROCEDURE — 83001 ASSAY OF GONADOTROPIN (FSH): CPT | Performed by: PHYSICIAN ASSISTANT

## 2018-11-07 PROCEDURE — 83002 ASSAY OF GONADOTROPIN (LH): CPT | Performed by: PHYSICIAN ASSISTANT

## 2018-11-07 PROCEDURE — 12000000 ZZH R&B MED SURG/OB

## 2018-11-07 PROCEDURE — 83519 RIA NONANTIBODY: CPT | Performed by: PHYSICIAN ASSISTANT

## 2018-11-07 PROCEDURE — 84436 ASSAY OF TOTAL THYROXINE: CPT | Performed by: PHYSICIAN ASSISTANT

## 2018-11-07 PROCEDURE — 83003 ASSAY GROWTH HORMONE (HGH): CPT | Performed by: PHYSICIAN ASSISTANT

## 2018-11-07 RX ORDER — PANTOPRAZOLE SODIUM 40 MG/1
40 TABLET, DELAYED RELEASE ORAL
Status: DISCONTINUED | OUTPATIENT
Start: 2018-11-08 | End: 2018-11-08 | Stop reason: HOSPADM

## 2018-11-07 RX ORDER — GADOBUTROL 604.72 MG/ML
9 INJECTION INTRAVENOUS ONCE
Status: COMPLETED | OUTPATIENT
Start: 2018-11-07 | End: 2018-11-07

## 2018-11-07 RX ADMIN — OXYCODONE HYDROCHLORIDE 10 MG: 5 TABLET ORAL at 14:24

## 2018-11-07 RX ADMIN — HYDROCORTISONE SODIUM SUCCINATE 50 MG: 100 INJECTION, POWDER, FOR SOLUTION INTRAMUSCULAR; INTRAVENOUS at 18:30

## 2018-11-07 RX ADMIN — SENNOSIDES AND DOCUSATE SODIUM 1 TABLET: 8.6; 5 TABLET ORAL at 19:49

## 2018-11-07 RX ADMIN — SODIUM CHLORIDE: 9 INJECTION, SOLUTION INTRAVENOUS at 21:59

## 2018-11-07 RX ADMIN — LEVOTHYROXINE SODIUM 88 MCG: 88 TABLET ORAL at 10:59

## 2018-11-07 RX ADMIN — ACETAMINOPHEN 975 MG: 325 TABLET, FILM COATED ORAL at 04:22

## 2018-11-07 RX ADMIN — HYDROMORPHONE HYDROCHLORIDE 0.5 MG: 1 INJECTION, SOLUTION INTRAMUSCULAR; INTRAVENOUS; SUBCUTANEOUS at 09:18

## 2018-11-07 RX ADMIN — HYDROCORTISONE SODIUM SUCCINATE 50 MG: 100 INJECTION, POWDER, FOR SOLUTION INTRAMUSCULAR; INTRAVENOUS at 11:01

## 2018-11-07 RX ADMIN — DEXAMETHASONE SODIUM PHOSPHATE 4 MG: 4 INJECTION, SOLUTION INTRAMUSCULAR; INTRAVENOUS at 09:17

## 2018-11-07 RX ADMIN — SENNOSIDES AND DOCUSATE SODIUM 1 TABLET: 8.6; 5 TABLET ORAL at 10:59

## 2018-11-07 RX ADMIN — DEXAMETHASONE 4 MG: 4 TABLET ORAL at 04:22

## 2018-11-07 RX ADMIN — GADOBUTROL 9 ML: 604.72 INJECTION INTRAVENOUS at 01:18

## 2018-11-07 RX ADMIN — BUPROPION HYDROCHLORIDE 300 MG: 300 TABLET, FILM COATED, EXTENDED RELEASE ORAL at 10:59

## 2018-11-07 RX ADMIN — SODIUM CHLORIDE: 9 INJECTION, SOLUTION INTRAVENOUS at 09:37

## 2018-11-07 RX ADMIN — ONDANSETRON 4 MG: 2 INJECTION INTRAMUSCULAR; INTRAVENOUS at 09:17

## 2018-11-07 RX ADMIN — ACETAMINOPHEN 975 MG: 325 TABLET, FILM COATED ORAL at 19:50

## 2018-11-07 RX ADMIN — PANTOPRAZOLE SODIUM 40 MG: 40 INJECTION, POWDER, FOR SOLUTION INTRAVENOUS at 09:17

## 2018-11-07 RX ADMIN — OXYCODONE HYDROCHLORIDE 5 MG: 5 TABLET ORAL at 19:47

## 2018-11-07 RX ADMIN — ACETAMINOPHEN 975 MG: 325 TABLET, FILM COATED ORAL at 12:53

## 2018-11-07 ASSESSMENT — ACTIVITIES OF DAILY LIVING (ADL)
ADLS_ACUITY_SCORE: 8
ADLS_ACUITY_SCORE: 8
ADLS_ACUITY_SCORE: 7
ADLS_ACUITY_SCORE: 8
ADLS_ACUITY_SCORE: 7
ADLS_ACUITY_SCORE: 8

## 2018-11-07 ASSESSMENT — PAIN DESCRIPTION - DESCRIPTORS
DESCRIPTORS: HEADACHE

## 2018-11-07 NOTE — ANESTHESIA POSTPROCEDURE EVALUATION
Patient: Catarino James    Procedure(s):  ENDOSCOPE ENDONASAL RESECTION OF SUPRACELLAR MASS    Diagnosis:SUPRASELLAR MASS  Diagnosis Additional Information: No value filed.    Anesthesia Type:  General, ETT    Note:  Anesthesia Post Evaluation    Patient location during evaluation: PACU  Patient participation: Able to fully participate in evaluation  Level of consciousness: awake, awake and alert and responsive to verbal stimuli  Pain management: adequate  Airway patency: patent  Cardiovascular status: acceptable  Respiratory status: acceptable  Hydration status: acceptable  PONV: none     Anesthetic complications: None          Last vitals:  Vitals:    11/06/18 1740 11/06/18 1750 11/06/18 1800   BP: 134/84 130/85 127/83   Pulse:      Resp: 12 10 11   Temp:  36.8  C (98.2  F)    SpO2: 100% 99% 95%         Electronically Signed By: Yolie Carter  November 6, 2018  6:34 PM

## 2018-11-07 NOTE — PLAN OF CARE
Problem: Patient Care Overview  Goal: Plan of Care/Patient Progress Review  Outcome: Improving  Pt A/Ox4. Neuro's intact. Nasal dressing with some bloody drainage. Sips of clear liquids, some nausea. Given zofran and compazine. 3L Oxymask LS clear. MRI completed last night. Significant other at bedside throughout the night.

## 2018-11-07 NOTE — PROGRESS NOTES
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  Discussed the option of surgery given the suprasellar extension and proximity to the optic chiasm. Subsequently underwent endoscopic endonasal transsphenoidal resection of suprasellar mass. Today, she is resting comfortably in bed but does endorse frontal headache. Neuro intact on exam. Per RN has had intermittent headache and did develop nausea, better controlled with zofran and compazine. Nasal packing intact without signs of drainage. Will plan to transfer to floor today if headache under control.     Active Problems:    Anxiety    Assessment: ongoing    Plan: per hospitalist    Depression    Assessment: ongoing    Plan: per hospitalist    S/P transsphenoidal hypophysectomy (H)    Assessment: neuro intact. Intermittent headache.    Plan:   - Will discontinue decadron and give Solucortef 50mg TID taper over 7 days  -Pain control  -Keep nasal packing in place until removed by provider  -OK to transfer to floor later today if HA under control  -PT/OT and ambulation      I have discussed the following assessment and plan with Dr. Pack who is in agreement with initial plan and will follow up with further consultation recommendations.      Virginia Romero PA-C  [facility]  [address] [address]    Tel [phone]  Pager [phone]      Interval History   Stable. Intermittent frontal headache.    Physical Exam   Temp: 96.7  F (35.9  C) Temp src: Axillary BP: 98/64 Pulse: 93 Heart Rate: 51 Resp: 10 SpO2: 100 % O2 Device: Oxymask Oxygen Delivery: 3 LPM  Vitals:    11/06/18 1150   Weight: 189 lb (85.7 kg)     Vital Signs with  "Ranges  Temp:  [96.7  F (35.9  C)-98.2  F (36.8  C)] 96.7  F (35.9  C)  Pulse:  [] 93  Heart Rate:  [] 51  Resp:  [7-18] 10  BP: ()/() 98/64  SpO2:  [93 %-100 %] 100 %  I/O last 3 completed shifts:  In: 2855 [I.V.:2855]  Out: 760 [Urine:710; Blood:50]    Heart Rate: 51, Blood pressure 98/64, pulse 93, temperature 96.7  F (35.9  C), temperature source Axillary, resp. rate 10, height 5' 6\" (1.676 m), weight 189 lb (85.7 kg), last menstrual period 08/08/2018, SpO2 100 %.  189 lbs 0 oz  HEENT:  Normocephalic, atraumatic.  PERRL.  EOM s intact.   Neck:  Supple, non-tender, without lymphadenopathy.  Heart:  No peripheral edema  Lungs:  No SOB  Skin:  Warm and dry. Nasal packing in place. No signs of drainage.  Extremities:  No edema, cyanosis or clubbing.    NEUROLOGICAL EXAMINATION:   Mental status:  Alert and Oriented x 3, speech is fluent.  Cranial nerves:  II-XII intact.   Motor:  Strength is 5/5 throughout the upper and lower extremities  Sensation:  intact.    Coordination:  Smooth finger to nose testing.   Negative pronator drift.        Medications     sodium chloride 75 mL/hr at 11/06/18 1836       acetaminophen  975 mg Oral Q8H     buPROPion (WELLBUTRIN XL) 24 hr tablet 300 mg  300 mg Oral Daily     dexamethasone  4 mg Intravenous Q6H    Or     dexamethasone  4 mg Oral Q6H     levothyroxine (SYNTHROID/LEVOTHROID) tablet 88 mcg  88 mcg Oral Daily     [START ON 11/8/2018] pantoprazole  40 mg Oral QAM AC     senna-docusate  1 tablet Oral BID    Or     senna-docusate  2 tablet Oral BID     sodium chloride (PF)  3 mL Intracatheter Q8H       Data   CBC RESULTS:   Recent Labs   Lab Test  01/10/18   1923   WBC  5.2   RBC  4.88   HGB  14.0   HCT  43.7   MCV  90   MCH  28.7   MCHC  32.0   RDW  15.4*   PLT  342     Basic Metabolic Panel:  Lab Results   Component Value Date     01/10/2018      Lab Results   Component Value Date    POTASSIUM 3.7 01/10/2018     Lab Results   Component Value Date "    CHLORIDE 102 01/10/2018     Lab Results   Component Value Date    FAINA 8.3 01/10/2018     Lab Results   Component Value Date    CO2 30 01/10/2018     Lab Results   Component Value Date    BUN 8 01/10/2018     Lab Results   Component Value Date    CR 0.74 01/10/2018     Lab Results   Component Value Date    GLC 96 07/02/2018     INR:  No results found for: INR

## 2018-11-07 NOTE — PROGRESS NOTES
Patient arrives to ICU with PACU RN and CNA without incident. Belongings received and checked by Sonny.  Patient AOx4, no neuro deficits noted. After pain medication patient resting comfortably on 3L oxymask.  Right nares packing intact, CDI.  Family updated on plan of care and given number to unit to call. Sonny is designated communicator for family.

## 2018-11-08 VITALS
RESPIRATION RATE: 16 BRPM | SYSTOLIC BLOOD PRESSURE: 136 MMHG | DIASTOLIC BLOOD PRESSURE: 82 MMHG | HEART RATE: 93 BPM | HEIGHT: 66 IN | OXYGEN SATURATION: 98 % | BODY MASS INDEX: 30.37 KG/M2 | WEIGHT: 189 LBS | TEMPERATURE: 98.1 F

## 2018-11-08 LAB
COPATH REPORT: NORMAL
GLUCOSE BLDC GLUCOMTR-MCNC: 124 MG/DL (ref 70–99)
SHBG SERPL-SCNC: 32 NMOL/L (ref 30–135)
TESTOST FREE SERPL-MCNC: 0.32 NG/DL (ref 0.13–0.92)
TESTOST SERPL-MCNC: 18 NG/DL (ref 8–60)

## 2018-11-08 PROCEDURE — 25000131 ZZH RX MED GY IP 250 OP 636 PS 637: Performed by: PHYSICIAN ASSISTANT

## 2018-11-08 PROCEDURE — 25000132 ZZH RX MED GY IP 250 OP 250 PS 637: Performed by: PHYSICIAN ASSISTANT

## 2018-11-08 PROCEDURE — 00000146 ZZHCL STATISTIC GLUCOSE BY METER IP

## 2018-11-08 PROCEDURE — 25000128 H RX IP 250 OP 636: Performed by: PHYSICIAN ASSISTANT

## 2018-11-08 PROCEDURE — 25000132 ZZH RX MED GY IP 250 OP 250 PS 637: Performed by: NEUROLOGICAL SURGERY

## 2018-11-08 RX ORDER — OXYCODONE HYDROCHLORIDE 5 MG/1
5-10 TABLET ORAL
Qty: 15 TABLET | Refills: 0 | Status: SHIPPED | OUTPATIENT
Start: 2018-11-08 | End: 2018-11-12

## 2018-11-08 RX ORDER — HYDROCORTISONE 10 MG/1
50 TABLET ORAL DAILY
Qty: 31 TABLET | Refills: 0 | Status: SHIPPED | OUTPATIENT
Start: 2018-11-08 | End: 2019-03-08

## 2018-11-08 RX ADMIN — ACETAMINOPHEN 975 MG: 325 TABLET, FILM COATED ORAL at 05:35

## 2018-11-08 RX ADMIN — HYDROMORPHONE HYDROCHLORIDE 0.5 MG: 1 INJECTION, SOLUTION INTRAMUSCULAR; INTRAVENOUS; SUBCUTANEOUS at 10:01

## 2018-11-08 RX ADMIN — OXYCODONE HYDROCHLORIDE 10 MG: 5 TABLET ORAL at 12:50

## 2018-11-08 RX ADMIN — ONDANSETRON 4 MG: 4 TABLET, ORALLY DISINTEGRATING ORAL at 08:54

## 2018-11-08 RX ADMIN — SENNOSIDES AND DOCUSATE SODIUM 2 TABLET: 8.6; 5 TABLET ORAL at 08:36

## 2018-11-08 RX ADMIN — HYDROCORTISONE SODIUM SUCCINATE 50 MG: 100 INJECTION, POWDER, FOR SOLUTION INTRAMUSCULAR; INTRAVENOUS at 00:56

## 2018-11-08 RX ADMIN — BUPROPION HYDROCHLORIDE 300 MG: 300 TABLET, FILM COATED, EXTENDED RELEASE ORAL at 08:37

## 2018-11-08 RX ADMIN — HYDROMORPHONE HYDROCHLORIDE 0.5 MG: 1 INJECTION, SOLUTION INTRAMUSCULAR; INTRAVENOUS; SUBCUTANEOUS at 00:54

## 2018-11-08 RX ADMIN — LEVOTHYROXINE SODIUM 88 MCG: 88 TABLET ORAL at 08:37

## 2018-11-08 RX ADMIN — HYDROCORTISONE SODIUM SUCCINATE 50 MG: 100 INJECTION, POWDER, FOR SOLUTION INTRAMUSCULAR; INTRAVENOUS at 08:53

## 2018-11-08 RX ADMIN — SODIUM CHLORIDE: 9 INJECTION, SOLUTION INTRAVENOUS at 11:21

## 2018-11-08 RX ADMIN — PANTOPRAZOLE SODIUM 40 MG: 40 TABLET, DELAYED RELEASE ORAL at 06:20

## 2018-11-08 RX ADMIN — OXYCODONE HYDROCHLORIDE 5 MG: 5 TABLET ORAL at 05:35

## 2018-11-08 RX ADMIN — OXYCODONE HYDROCHLORIDE 5 MG: 5 TABLET ORAL at 02:02

## 2018-11-08 RX ADMIN — OXYCODONE HYDROCHLORIDE 10 MG: 5 TABLET ORAL at 08:37

## 2018-11-08 RX ADMIN — ACETAMINOPHEN 975 MG: 325 TABLET, FILM COATED ORAL at 12:50

## 2018-11-08 ASSESSMENT — ACTIVITIES OF DAILY LIVING (ADL)
ADLS_ACUITY_SCORE: 7

## 2018-11-08 NOTE — PROGRESS NOTES
[facility]    Neurosurgery  Daily Post-Op Note    Assessment & Plan   Procedure(s):  ENDOSCOPE ENDONASAL RESECTION OF SUPRACELLAR MASS   -2 Days Post-Op     Doing well.  Pain well-controlled.    Plan:  -Advance activity as tolerated  -Advance diet as tolerated  -Nasal packing dc'd.   -home today.   -f/u with Dr. Pack one week from Monday, 11/19/18.       Nitin Hurt    Interval History   Stable.  Doing well.  Improving slowly.  Pain is reasonably controlled.  No fevers.     Physical Exam   Temp: 97.5  F (36.4  C) Temp src: Oral BP: 112/68   Heart Rate: (!) 48 Resp: 16 SpO2: 97 % O2 Device: None (Room air)    Vitals:    11/06/18 1150   Weight: 189 lb (85.7 kg)     Vital Signs with Ranges  Temp:  [97.1  F (36.2  C)-97.8  F (36.6  C)] 97.5  F (36.4  C)  Heart Rate:  [42-88] 48  Resp:  [9-18] 16  BP: (100-125)/(59-83) 112/68  SpO2:  [95 %-100 %] 97 %  I/O last 3 completed shifts:  In: 2232.5 [P.O.:1160; I.V.:1072.5]  Out: 480 [Urine:480]    Alert and oriented.  Moves all extremities equally.  Reflexes symmetrical.       Medications     sodium chloride 75 mL/hr at 11/07/18 2159        acetaminophen  975 mg Oral Q8H     buPROPion (WELLBUTRIN XL) 24 hr tablet 300 mg  300 mg Oral Daily     hydrocortisone sodium succinate PF  50 mg Intravenous Q8H    Followed by     [START ON 11/9/2018] hydrocortisone sodium succinate PF  50 mg Intravenous BID    Followed by     [START ON 11/11/2018] hydrocortisone sodium succinate PF  25 mg Intravenous Q12H    Followed by     [START ON 11/13/2018] hydrocortisone sodium succinate PF  25 mg Intravenous Daily     levothyroxine (SYNTHROID/LEVOTHROID) tablet 88 mcg  88 mcg Oral Daily     pantoprazole  40 mg Oral QAM AC     senna-docusate  1 tablet Oral BID    Or     senna-docusate  2 tablet Oral BID     sodium chloride (PF)  3 mL Intracatheter Q8H             Nitin Hurt PA-C  [facility]

## 2018-11-08 NOTE — PROVIDER NOTIFICATION
MD Notification    Notified Person: MD    Notified Person Name: Rachelle Howard JESSIE    Notification Date/Time: 11/7/2018 7:18 PM     Notification Interaction: paged    Purpose of Notification: patient coming from the ICU. Orders for continuing q2 neuros but ICU charge had said not coming special care. Are you continuing q2 hr for this patient Thanks    Orders Received: patient to be on q4 neuros and vitals signs per unit routine    Comments:

## 2018-11-08 NOTE — DISCHARGE INSTRUCTIONS
Spine and Brain [facility] at [facility]  Dr. Pack Discharge Instructions Following Brain Surgery  [phone]  Monday-Friday; 8:00 AM - 4:00 PM    Bowel Care:  Because narcotics and inactivity can cause constipation, you need to watch your bowel habits.  Plenty of fluid, high fiber foods, walking, and stool softeners are important.  If you feel bloated and /or stop passing gas we recommend prune juice or over the counter oral laxatives and suppositories.  Do not strain with your bowel movements.    Medications:  Pain management is unique to all patients.  We will try to keep your pain under control after surgery and maximize your comfort as much as the medications will allow.  You will generally be given  medications for pain and for constipation.  You may be given a prescription to prevent seizures.  You may also be given a steroid (Decadron) and then something to protect your stomach (such as, Pepcid).  It is important that you use these as prescribed.  Please remember to bring your pill bottles to all of your appointments.   If you require a refill, you need to allow 3 business days to give time to review medication needs and call you if needed.  Some narcotic prescriptions need to be picked up or mailed to your pharmacy. DO NOT suddenly stop medication used for seizures unless directed by your provider.  Avoid alcohol beverages while taking medications.   You can use ice to areas of pain as needed, 20 minutes at a time.   Activity:   You are encouraged to walk: start with short walks 5-10 minutes at a time for 4-5 times per day and increase as tolerated.  Some days you will have more energy than others, this is normal.  Stair climbing as tolerated, we recommend you use the railing. No lifting greater than 10 pounds:  approximately equal to one gallon of milk.  No straining or strenuous activity.  No bending your head down below your heart.     Driving:  No driving until cleared by your provider.   Ask your provider at your post op visit if it is ok for you to drive.  If you have had a seizure, by law, you cannot drive for 6 months and have remained seizure free.  You may ride in a car as tolerated.     Nutrition:  In general, your diet restrictions will not change with your surgery.  You may need to eat small frequent meals initially until your appetite returns.  Eat plenty of high fiber foods and drink plenty of fluids.   If you were discharged with a fluid restriction, drink juices, sodas, Gatorade and vegetable juices.    Call your doctor or go to the nearest Emergency Room if these occur:  Drainage from your nose, increased pain/redness/swelling, temperatures greater than 101.5, leg pain or swelling, unrelieved headaches, new changes from your baseline (vison, movement, sedation, or increased confusion - your family may notice these changes before you notice these changes), or seizure activity

## 2018-11-08 NOTE — PLAN OF CARE
Problem: Patient Care Overview  Goal: Plan of Care/Patient Progress Review  Outcome: Improving  Neuro assessment intact.  Pt drowsy throughout the day.  Arouses easily.  HR runs 40-60 when patient is sleeping.  Denies nausea. Nasal dressing changed x1, small amount of dried serosanginous drainage. Drinking clear liquids.  Pain is controlled with ice pack, scheduled Tylenol, and prn oxycodone.  Pt up to the chair without difficulty for 20 minutes. Doss pulled without difficulty at 1600, catheter intact.  Pt has not voided as of 1900.  Report given to LINDA Wong on [address].  Pt will transfer to room 707 when the room is clean.

## 2018-11-08 NOTE — PLAN OF CARE
Problem: Patient Care Overview  Goal: Plan of Care/Patient Progress Review  Outcome: Improving  Arrived to the unit around 2145. Pt A&Ox4. VSS. Neuro intact. Dressing on nares clean dry and intact. Lungs clear. IV infusing. Passing gas. C/O headache decreased with ice, scheduled tylenol and PRN oxy. Possible discharge today.

## 2018-11-08 NOTE — PLAN OF CARE
Problem: Patient Care Overview  Goal: Plan of Care/Patient Progress Review  Outcome: No Change  A&Ox4, neuro intact. C/o HA, improved with ice pack, prn oxycodone, and scheduled Tylenol. IVF @ 75mL/hr. Due to void. Hypoactive BS, now passing gas. Pt transferred to [facility] with all belongings, family updated.

## 2018-11-08 NOTE — PLAN OF CARE
Problem: Patient Care Overview  Goal: Plan of Care/Patient Progress Review  Outcome: Adequate for Discharge Date Met: 11/08/18  POD 2: A&Ox4. Neuros intact, except headache. VSS. BS+, + flatus. Voiding adequately. Tolerating regular diet. Up with SBA. C/o headache, decreased w/ oxycodone. Nasal packing removed w/o difficulty, no excess drainage. Plan to discharge home w/ spouse.

## 2018-11-09 NOTE — DISCHARGE SUMMARY
[facility]    Discharge Summary  Neurosurgery    Date of Admission:  11/6/2018  Date of Discharge:  11/8/2018  3:10 PM  Discharging Provider: Mervin Pack  Date of Service (when I saw the patient): 11/7/18    Discharge Diagnoses   Active Problems:    Anxiety    Depression    S/P transsphenoidal hypophysectomy (H)      History of Present Illness   Catarino James is an 37 year old female who presented with headaches and menstrual irregularities.    Hospital Course   Catarino James was admitted on 11/6/2018.  The following problems were addressed during her hospitalization:    Active Problems:      S/P transsphenoidal hypophysectomy (H)    Assessment: Recovering well    Plan: Routine post-op care        Significant Results and Procedures   TSH    Pending Results   These results will be followed up by myself  Unresulted Labs Ordered in the Past 30 Days of this Admission     Date and Time Order Name Status Description    11/7/2018 0000 IgF binding protein 1 In process           Code Status   Full Code    Primary Care Physician   Katalina Muñoz    Physical Exam   Temp: 98.1  F (36.7  C) Temp src: Oral BP: 136/82   Heart Rate: 62 Resp: 16 SpO2: 98 % O2 Device: None (Room air)    Vitals:    11/06/18 1150   Weight: 85.7 kg (189 lb)     Vital Signs with Ranges  Temp:  [98.1  F (36.7  C)] 98.1  F (36.7  C)  Heart Rate:  [62] 62  Resp:  [16] 16  BP: (136)/(82) 136/82  SpO2:  [98 %] 98 %  I/O last 3 completed shifts:  In: 5087.5 [P.O.:1160; I.V.:3927.5]  Out: 1240 [Urine:1190; Blood:50]    HEENT:  Normocephalic, atraumatic.  PERRLA.  EOM s intact.  Visual fields full to gross exam  Neck:  Supple, non-tender, without lymphadenopathy.  Heart:  No peripheral edema  Lungs:  No SOB  Abdomen:  Non-distended.   Skin:  Warm and dry.  Extremities:  No edema, cyanosis or clubbing.    NEUROLOGICAL EXAMINATION:     Mental status:  Alert and Oriented x 3, speech is fluent.  Cranial nerves:  II-XII intact.   Motor:   Strength is 5/5 throughout the upper and lower extremities  Shoulder Abduction:  Right:  5/5   Left:  5/5  Biceps:                      Right:  5/5   Left:  5/5  Triceps:                     Right:  5/5   Left:  5/5  Wrist Extensors:       Right:  5/5   Left:  5/5  Wrist Flexors:           Right:  5/5   Left:  5/5  interosseus :            Right:  5/5   Left:  5/5   Hip Flexor:                Right: 5/5  Left:  5/5  Hip Adductor:             Right:  5/5  Left:  5/5  Hip Abductor:             Right:  5/5  Left:  5/5  Gastroc Soleus:        Right:  5/5  Left:  5/5  Tib/Ant:                      Right:  5/5  Left:  5/5  EHL:                     Right:  5/5  Left:  5/5  Sensation:  Intact  Reflexes:  Negative Babinski.  Negative Clonus.  Negative Jorgensen's.  Coordination:  Smooth finger to nose testing.   Negative pronator drift.  Smooth tandem walking  Incision c/d/i    Time Spent on this Encounter   I, Mervin Pack, personally saw the patient today and spent greater than 30 minutes discharging this patient.    Discharge Disposition   Discharged to home  Condition at discharge: Good    Consultations This Hospital Stay   None    Discharge Orders     Reason for your hospital stay   Pituitary adenoma resection     Follow-up and recommended labs and tests    F/u 11/19/18 with Dr. Pack for debridement     Activity   Your activity upon discharge: activity as tolerated     Discharge Instructions   Discharge instructions:  No lifting of more than 10 pounds until follow up visit.  Ok to shampoo hair, shower or bathe, but, do not scrub or submerge incision under water until evaluated post operatively in clinic.    Ok to walk as tolerated, avoid bedrest.    No contact sports until after follow up visit  No high impact activities such as; running/jogging, snowmobile or 4 dolan riding or any other recreational vehicles    Call my office at [phone] for increasing redness, swelling or pus draining from the incision, increased  pain or any other questions and concerns.    Go to ER with any seizure activity, mental status change (increasing confusion), difficulty with speech or increasing or acute weakness     Full Code     Diet   Follow this diet upon discharge: Regular       Discharge Medications   Discharge Medication List as of 11/8/2018  2:57 PM      START taking these medications    Details   hydrocortisone (CORTEF) 10 MG tablet Take 5 tablets (50 mg) by mouth daily Starting Friday 50 mg twice daily for 2 days then 25 mg twice daily for 2 days then 25 mg once daily, Disp-31 tablet, R-0, [id number] mg twice daily for 2 days then 25 mg twice daily for 2 days then 25 mg once da sam      oxyCODONE IR (ROXICODONE) 5 MG tablet Take 1-2 tablets (5-10 mg) by mouth every 3 hours as needed, Disp-15 tablet, R-0, Local Print         CONTINUE these medications which have NOT CHANGED    Details   Amphetamine-Dextroamphetamine (ADDERALL PO) Take 20-40 mg by mouth 2 times daily as needed (Never take after 15:00), Historical      BuPROPion HCl (WELLBUTRIN XL PO) Take 300 mg by mouth daily, Historical      LEVOTHYROXINE SODIUM PO Take 88 mcg by mouth daily, Historical           Allergies   No Known Allergies

## 2018-11-12 DIAGNOSIS — E89.3 S/P TRANSSPHENOIDAL HYPOPHYSECTOMY (H): ICD-10-CM

## 2018-11-12 RX ORDER — OXYCODONE HYDROCHLORIDE 5 MG/1
5-10 TABLET ORAL EVERY 6 HOURS PRN
Qty: 30 TABLET | Refills: 0 | Status: SHIPPED | OUTPATIENT
Start: 2018-11-12 | End: 2019-03-08

## 2018-11-13 ENCOUNTER — OFFICE VISIT (OUTPATIENT)
Dept: NEUROSURGERY | Facility: CLINIC | Age: 38
End: 2018-11-13
Attending: NEUROLOGICAL SURGERY
Payer: COMMERCIAL

## 2018-11-13 VITALS — TEMPERATURE: 98.1 F | HEART RATE: 79 BPM

## 2018-11-13 DIAGNOSIS — J34.89 NASAL CRUSTING: Primary | ICD-10-CM

## 2018-11-13 PROCEDURE — 31237 NSL/SINS NDSC SURG BX POLYPC: CPT | Mod: 50

## 2018-11-13 PROCEDURE — G0463 HOSPITAL OUTPT CLINIC VISIT: HCPCS

## 2018-11-13 PROCEDURE — 31237 NSL/SINS NDSC SURG BX POLYPC: CPT | Mod: 58 | Performed by: NEUROLOGICAL SURGERY

## 2018-11-13 NOTE — LETTER
11/13/2018         RE: Catarino James  [address]  [place] [address]        Dear Colleague,    Thank you for referring your patient, Catarino James, to the [facility]UR[facility]ERY [facility]IC. Please see a copy of my visit note below.    1 week post-op from [facility].  Notes sinus fullness and leandro-orbital headaches.  Doing nasal spray.  Will start irrigations.    Today I performed endoscopic endonasal debridement in the clinic.  Bilateral diagnostic nasal endoscopy was performed, and I suctioned significant volume of mucous from the right nasal cavity, and removed crusting with forceps.  The nasal mucosa was noted to be healing well.    Again, thank you for allowing me to participate in the care of your patient.        Sincerely,        Mervin Pack MD

## 2018-11-13 NOTE — PATIENT INSTRUCTIONS
1. Start using nasal irrigation 1-2 times per day. If bleeding occurs, only use once per day.  2. Continue with post op activity restrictions.  3. Follow up for 2nd debridement as scheduled      Please call our clinic with any questions or concerns: [phone]

## 2018-11-13 NOTE — NURSING NOTE
Invasive Procedure Safety Checklist  Procedure:  Nasal debridement    Responsible person(s):  Complete sections as appropriate and electronically sign and date below.    Staff/Provider  Consent documentation on chart:  YES  H&P is not applicable (when straight local anesthesia is used).    Procedure Team  Completed by comparing informed consent documentation, information on the patient record and/or the marked surgical site, and discussion with the patient/guardian.     Verified:  (Select all that apply)  Patient identification (two indicators)  Procedure to be performed  Procedure site and /or laterality and/or level  Consent  Procedure site:  Site marking not requred.  Provider Mason - Site/Laterality/Level:    Staff/Provider:  No images    Procedure Team:  *Pause for the Cause* verbal and active participation of team members- verify:  Patient name:  YES  Procedure to be performed:  YES  Site, laterality and level, noting patient position:  YES    Scope used: Scope #1 SN [id number]  Lidocaine administered intra-nasal    Above steps completed as applicable (Electronic Signature, Title, Date):    LINDA Quintero 11/13/18    Note:  Any incidents of wrong patient, wrong procedure, or wrong site are reported using the Occurrence Process already in place.  The occurrence form is required to be completed immediately with this type of event.

## 2018-11-13 NOTE — PROGRESS NOTES
1 week post-op from [facility].  Notes sinus fullness and leandro-orbital headaches.  Doing nasal spray.  Will start irrigations.    Today I performed endoscopic endonasal debridement in the clinic.  Bilateral diagnostic nasal endoscopy was performed, and I suctioned significant volume of mucous from the right nasal cavity, and removed crusting with forceps.  The nasal mucosa was noted to be healing well.

## 2018-11-13 NOTE — MR AVS SNAPSHOT
After Visit Summary   11/13/2018    Catarino James    MRN: [id number]           Patient Information     Date Of Birth          1980        Visit Information        Provider Department      11/13/2018 7:30 AM Mervin Pack MD [facility]        Today's Diagnoses     Nasal crusting    -  1      Care Instructions    1. Start using nasal irrigation 1-2 times per day. If bleeding occurs, only use once per day.  2. Continue with post op activity restrictions.  3. Follow up for 2nd debridement as scheduled      Please call our clinic with any questions or concerns: [phone]              Follow-ups after your visit        Your next 10 appointments already scheduled     Nov 26, 2018 12:30 PM CST   Return Visit with Mervin Pack MD   [facility] ([facility])    [address] [place] [id number] [phone]            Jan 02, 2019 12:00 PM CST   Return Visit with Virginia Romero PA-C   [facility] ([facility])    [address] [place] [address]   [phone]            Feb 18, 2019 12:30 PM CST   Return Visit with Mervin Pack MD   [facility] ([facility])    [address] [place] [address]   [phone]              Who to contact     If you have questions or need follow up information about today's clinic visit or your schedule please contact [facility] directly at [phone].  Normal or non-critical lab and imaging results will be communicated to you by MyChart, letter or phone within 4 business days after the clinic has received the results. If you do not hear from us within 7 days, please contact the clinic through MyChart or phone. If you have a critical or abnormal lab result, we will notify you by phone as soon as  "possible.  Submit refill requests through [email or web address] or call your pharmacy and they will forward the refill request to us. Please allow 3 business days for your refill to be completed.          Additional Information About Your Visit        3DSoCharEQAL Information     [email or web address] lets you send messages to your doctor, view your test results, renew your prescriptions, schedule appointments and more. To sign up, go to [email or web address] . Click on \"Log in\" on the left side of the screen, which will take you to the Welcome page. Then click on \"Sign up Now\" on the right side of the page.     You will be asked to enter the access code listed below, as well as some personal information. Please follow the directions to create your username and password.     Your access code is: [email or web address]  Expires: 2019 12:24 PM     Your access code will  in 90 days. If you need help or a new code, please call your [facility] clinic or [phone].        Care EveryWhere ID     This is your Care EveryWhere ID. This could be used by other organizations to access your [facility] medical records  [id number]         Blood Pressure from Last 3 Encounters:   18 136/82   10/22/18 125/88   01/10/18 122/76    Weight from Last 3 Encounters:   18 189 lb (85.7 kg)   10/22/18 195 lb 12.8 oz (88.8 kg)   01/10/18 180 lb (81.6 kg)              Today, you had the following     No orders found for display       Primary Care Provider Office Phone # Fax #    Katalina Muñoz [phone]       [facility] [address] [place] [address]        Equal Access to Services     [facility] AH: Hadii maritza jacobson Soerin, waaxda luqadaha, qaybta kaalmada adeyousifda, shwetha sharpe. So [facility] [phone].    ATENCIÓN: Si habla español, tiene a fish disposición servicios gratuitos de asistencia lingüística. Llame al [phone].    We comply with applicable federal civil rights laws and " [place] laws. We do not discriminate on the basis of race, color, national origin, age, disability, sex, sexual orientation, or gender identity.            Thank you!     Thank you for choosing [facility]INIC  for your care. Our goal is always to provide you with excellent care. Hearing back from our patients is one way we can continue to improve our services. Please take a few minutes to complete the written survey that you may receive in the mail after your visit with us. Thank you!             Your Updated Medication List - Protect others around you: Learn how to safely use, store and throw away your medicines at www.disposemymeds.org.          This list is accurate as of 11/13/18  8:04 AM.  Always use your most recent med list.                   Brand Name Dispense Instructions for use Diagnosis    ADDERALL PO      Take 20-40 mg by mouth 2 times daily as needed (Never take after 15:00)        hydrocortisone 10 MG tablet    CORTEF    31 tablet    Take 5 tablets (50 mg) by mouth daily Starting Friday 50 mg twice daily for 2 days then 25 mg twice daily for 2 days then 25 mg once daily    S/P transsphenoidal hypophysectomy (H)       LEVOTHYROXINE SODIUM PO      Take 88 mcg by mouth daily        oxyCODONE IR 5 MG tablet    ROXICODONE    30 tablet    Take 1-2 tablets (5-10 mg) by mouth every 6 hours as needed for moderate to severe pain    S/P transsphenoidal hypophysectomy (H)       WELLBUTRIN XL PO      Take 300 mg by mouth daily

## 2018-11-28 LAB — LAB SCANNED RESULT: NORMAL

## 2018-12-11 ENCOUNTER — TELEPHONE (OUTPATIENT)
Dept: NEUROSURGERY | Facility: OTHER | Age: 38
End: 2018-12-11

## 2018-12-11 NOTE — TELEPHONE ENCOUNTER
Contacted patient to follow-up on post op recovery. She is s/p transsphenoidal hypophysectomy on 11/6/18 with Dr. Pack. She attended 1 post op nasal debridement, but has not shown for the other scheduled debridement appts.     Contact patient today to check in with her recovery, but her phone is unavailable.     Contacted her emergency contact, Micheal, and spoke to him. Micheal states patient is recovery well overall, but he will have her call our clinic to discuss any post op questions with the nurses.     Patient is scheduled for a 6 week post op follow-up with Virginia on 1/2/19, and a 90 day appt with Dr. Pack. I did encourage patient to keep these appts if possible. Will communicate information with care team as well.

## 2019-01-31 NOTE — PROVIDER NOTIFICATION
"Madan MENDOZA paged, \"100 [facility]: Does pt need steroid taper ordered for discharge? Please advise as she would like to discharge asap, thank you. [phone] Fiorella\"    ADDENDUM: Madan paged again, \"If you are ordering the steroid taper for discharge, pt would like it ordered to pharmacy in [place], it has been updated in the computer.\"   " Cheek To Nose Interpolation Flap Division And Inset Text: Division and inset of the cheek to nose interpolation flap was performed to achieve optimal aesthetic result, restore normal anatomic appearance and avoid distortion of normal anatomy, expedite and facilitate wound healing, achieve optimal functional result and because linear closure either not possible or would produce suboptimal result. The patient was prepped and draped in the usual manner. The pedicle was infiltrated with local anesthesia. The pedicle was sectioned with a #15 blade. The pedicle was de-bulked and trimmed to match the shape of the defect. Hemostasis was achieved. The flap donor site and free margin of the flap were secured with deep buried sutures and the wound edges were re-approximated.

## 2019-02-18 ENCOUNTER — TELEPHONE (OUTPATIENT)
Dept: NEUROSURGERY | Facility: CLINIC | Age: 39
End: 2019-02-18

## 2019-02-18 DIAGNOSIS — E89.3 S/P TRANSSPHENOIDAL HYPOPHYSECTOMY (H): Primary | ICD-10-CM

## 2019-02-18 NOTE — PROGRESS NOTES
Patient did not show for her post op follow-up appt today. Dr. Pack would recommend she still obtain post op MRI if possible. Order has been placed. Will have scheduling team coordinate this appt with patient.

## 2019-03-08 ENCOUNTER — HOSPITAL ENCOUNTER (EMERGENCY)
Facility: CLINIC | Age: 39
Discharge: PSYCHIATRIC OR SUBSTANCE USE FACILITY | End: 2019-03-08
Attending: EMERGENCY MEDICINE | Admitting: EMERGENCY MEDICINE
Payer: COMMERCIAL

## 2019-03-08 VITALS
HEART RATE: 81 BPM | RESPIRATION RATE: 18 BRPM | OXYGEN SATURATION: 100 % | SYSTOLIC BLOOD PRESSURE: 131 MMHG | WEIGHT: 200 LBS | DIASTOLIC BLOOD PRESSURE: 100 MMHG | TEMPERATURE: 98.2 F | BODY MASS INDEX: 32.28 KG/M2

## 2019-03-08 DIAGNOSIS — R45.851 SUICIDAL IDEATION: ICD-10-CM

## 2019-03-08 LAB
AMPHETAMINES UR QL SCN: POSITIVE
BARBITURATES UR QL: NEGATIVE
BENZODIAZ UR QL: NEGATIVE
CANNABINOIDS UR QL SCN: POSITIVE
COCAINE UR QL: NEGATIVE
OPIATES UR QL SCN: NEGATIVE
PCP UR QL SCN: NEGATIVE

## 2019-03-08 PROCEDURE — 90791 PSYCH DIAGNOSTIC EVALUATION: CPT

## 2019-03-08 PROCEDURE — 99285 EMERGENCY DEPT VISIT HI MDM: CPT | Mod: 25

## 2019-03-08 PROCEDURE — 80307 DRUG TEST PRSMV CHEM ANLYZR: CPT | Performed by: EMERGENCY MEDICINE

## 2019-03-08 RX ORDER — FLUOXETINE 20 MG/1
20 TABLET, FILM COATED ORAL DAILY
COMMUNITY

## 2019-03-08 ASSESSMENT — ENCOUNTER SYMPTOMS: DYSPHORIC MOOD: 1

## 2019-03-08 NOTE — ED TRIAGE NOTES
"Father passed away a couple of months ago.  Found a brain tumor and had it removed right after her fathers passing.  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  Patient has has been feeling impulsive, suicidal, has a couple of plans, has written suicide notes.  Past suicide attempt about 20 years ago.  Patient states \"I just don't trust myself, I have a son and need to get help.\"  ABCDs intact.  Drove self here today.  "

## 2019-03-08 NOTE — ED PROVIDER NOTES
"  History     Chief Complaint:  Suicidal ideation    HPI   Catarino James is a 38 year old female who presents with suicidal ideation. The patient states that in the last 6 months her father has , she has had brain surgery to remove a tumor, and she has been trying to get pregnant. She feels that dealing with her father's death has not gotten easier and she feels that her family has not been supportive of her. Because of this, she has had increasing depression and suicidal ideation and feels like she \"just wants to be with her dad again\". She did attempt suicide around 20 years ago and says that she has a plan to commit suicide that would be different than her previous method. The patient does not trust herself to not harm herself and feels like she needs to be admitted to get her suicidal ideation under better control. She has not been seeing any counselor and denies any drug or alcohol use. Her PCP recently started her on Prozac.       Allergies:  No known allergies     Medications:    Wellbutrin  Prozac  Levothyroxine     Past Medical History:    ADHD  Anemia  Anxiety  Depression  Hypothyroidism  Pituitary macroadenoma  Vitiligo    Past Surgical History:      Optical tracking system endoscopic endonasal surgery    Family History:    No past pertinent family history.    Social History:  Patient presents alone  Negative for tobacco use.  Negative for alcohol use.  Marital Status:  Single     Review of Systems   Psychiatric/Behavioral: Positive for dysphoric mood and suicidal ideas.   All other systems reviewed and are negative.      Physical Exam   First Vitals:  BP: (!) 150/98  Pulse: 100  Temp: 98.2  F (36.8  C)  Resp: 16  Weight: 90.7 kg (200 lb)  SpO2: 100 %      Physical Exam  Vital signs and nursing notes reviewed.     Constitutional: laying on gurney,tearful  HENT: Oropharynx is clear and moist  Eyes: Conjunctivae are normal bilaterally. Pupils equal  Neck: normal range of " motion  Cardiovascular: Normal rate, regular rhythm, normal heart sounds.   Pulmonary/Chest: Effort normal and breath sounds normal. No respiratory distress.   Abdominal: Soft. Bowel sounds are normal. No tenderness to palpation. No rebound or guarding.   Musculoskeletal: No joint swelling or edema.   Neurological: Alert and oriented. No focal weakness  Skin: Skin is warm and dry. No rash noted.   Psych:  Suicidal ideation. tearful. No hallucination. No agitation.     Emergency Department Course   Emergency Department Course:  Nursing notes and vitals reviewed.  0800: I performed an exam of the patient as documented above. DEC was consulted and will assess the patient    1010: DEC has informed me of their assessment. Plan is to admit the patient for further psychiatric care.     Impression & Plan    Medical Decision Making:  Catarino James is a 38 year old female who presents with suicidal ideation. On examination, she reports frequent suicidal thoughts, major depressive symptoms as well as not feeling comfortable that she would not do something to hurt herself. She has no agitation or hallucinations. Her physical exam is otherwise unremarkable. DEC has evaluated the patient in the ED and agrees the patient would benefit from inpatient admission and meets criteria for this. Patient is cooperative, voluntary. There is a long wait at [facility] at this time, therefore there is further investigation into other outlying inpatient psychiatric hospitalist of which patient said she would be amenable to transfer there. We are currently waiting for acceptance at an inpatient facility. I discussed the case with my partner, who will follow up on disposition plan. Patient is on a [facility] hold.    Diagnosis:    ICD-10-CM    1. Suicidal ideation R45.851        Joel RUBI, am serving as a scribe on 3/8/2019 at 8:00 AM to personally document services performed by Nitin Castelan MD based on my observations and the  provider's statements to me.       Joel Ortiz  3/8/2019   [facility] EMERGENCY DEPARTMENT       Nitin Castelan MD  03/08/19 0512

## 2019-03-09 NOTE — ED PROVIDER NOTES
[facility] ED Behavioral Health Handoff Note:       Brief HPI:  This is a 38 year old female signed out to me by Dr. Castelan .  See initial ED Provider note for details of the presentation.     Patient is medically cleared for admission to a Behavioral Health unit.          The patient is not on a hold.        The patient has required medication for agitation.      Exam:   Temp:  [98.2  F (36.8  C)] 98.2  F (36.8  C)  Pulse:  [] 81  Resp:  [16-18] 18  BP: (131-150)/() 131/100  SpO2:  [95 %-100 %] 100 %  General: alert  HEENT:   The scalp and head appear normal    The pupils are equal      Lungs:  Clear.      No rales, no wheezing.      There is no tachypnea.  Non-labored.  Cardiac: Regular rate.      Normal S1 and S2.      No pathological murmur/rub       Neurologic:     Normal mentation.  No cranial nerve deficits.  No focal motor or sensory                            changes.      Speech normal.  Psych:  Awake.     Alert.      Normal affect.      Appropriate interactions.  .        ED Course:    There were no significant events while under my care.      Patient was signed out to the oncoming provider. Dr. Paul      Impression:    ICD-10-CM    1. Suicidal ideation R45.851 Drug abuse screen 77 urine ([place],[facility])       Plan:    1. Await Transfer to Mental Health Facility      RESULTS:   Results for orders placed or performed during the hospital encounter of 03/08/19 (from the past 24 hour(s))   Drug abuse screen 77 urine ([place],[facility])     Status: Abnormal    Collection Time: 03/08/19  6:54 PM   Result Value Ref Range    Amphetamine Qual Urine Positive (A) NEG^Negative    Barbiturates Qual Urine Negative NEG^Negative    Benzodiazepine Qual Urine Negative NEG^Negative    Cannabinoids Qual Urine Positive (A) NEG^Negative    Cocaine Qual Urine Negative NEG^Negative    Opiates Qualitative Urine Negative NEG^Negative    PCP Qual Urine Negative NEG^Negative             Butch Kumar,  MD  03/12/19 1822

## 2019-03-25 ENCOUNTER — HOSPITAL ENCOUNTER (OUTPATIENT)
Dept: MRI IMAGING | Facility: CLINIC | Age: 39
Discharge: HOME | End: 2019-03-25
Attending: NEUROLOGICAL SURGERY | Admitting: NEUROLOGICAL SURGERY
Payer: COMMERCIAL

## 2019-03-25 DIAGNOSIS — E89.3 S/P TRANSSPHENOIDAL HYPOPHYSECTOMY (H): ICD-10-CM

## 2019-03-25 PROCEDURE — 25500064 ZZH RX 255 OP 636: Performed by: NEUROLOGICAL SURGERY

## 2019-03-25 PROCEDURE — 70553 MRI BRAIN STEM W/O & W/DYE: CPT

## 2019-03-25 PROCEDURE — A9585 GADOBUTROL INJECTION: HCPCS | Performed by: NEUROLOGICAL SURGERY

## 2019-03-25 RX ORDER — GADOBUTROL 604.72 MG/ML
10 INJECTION INTRAVENOUS ONCE
Status: COMPLETED | OUTPATIENT
Start: 2019-03-25 | End: 2019-03-25

## 2019-03-25 RX ADMIN — GADOBUTROL 9 ML: 604.72 INJECTION INTRAVENOUS at 13:46

## 2019-06-10 NOTE — ED NOTES
Patient alert and oriented times 3 .  Abc intact all over abdominal pain started yesterday at 0830 .   
within normal limits

## 2019-08-26 DIAGNOSIS — E89.3 S/P TRANSSPHENOIDAL HYPOPHYSECTOMY (H): Primary | ICD-10-CM

## 2019-08-26 NOTE — PROGRESS NOTES
Patient did not show for her post op appt today. Dr. Pack recommends she still obtain post op MRI if possible. Order placed. Will send message to scheduling to coordinate MRI appt with patient.

## 2019-08-28 ENCOUNTER — HOSPITAL ENCOUNTER (OUTPATIENT)
Dept: MRI IMAGING | Facility: CLINIC | Age: 39
Discharge: HOME | End: 2019-08-28
Attending: NEUROLOGICAL SURGERY | Admitting: NEUROLOGICAL SURGERY
Payer: COMMERCIAL

## 2019-08-28 DIAGNOSIS — E89.3 S/P TRANSSPHENOIDAL HYPOPHYSECTOMY (H): ICD-10-CM

## 2019-08-28 PROCEDURE — 70553 MRI BRAIN STEM W/O & W/DYE: CPT

## 2019-08-28 PROCEDURE — A9585 GADOBUTROL INJECTION: HCPCS | Performed by: NEUROLOGICAL SURGERY

## 2019-08-28 PROCEDURE — 25500064 ZZH RX 255 OP 636: Performed by: NEUROLOGICAL SURGERY

## 2019-08-28 RX ORDER — GADOBUTROL 604.72 MG/ML
9 INJECTION INTRAVENOUS ONCE
Status: COMPLETED | OUTPATIENT
Start: 2019-08-28 | End: 2019-08-28

## 2019-08-28 RX ADMIN — GADOBUTROL 9 ML: 604.72 INJECTION INTRAVENOUS at 18:48

## 2019-09-04 ENCOUNTER — TELEPHONE (OUTPATIENT)
Dept: NEUROSURGERY | Facility: CLINIC | Age: 39
End: 2019-09-04

## 2019-09-04 DIAGNOSIS — E89.3 S/P TRANSSPHENOIDAL HYPOPHYSECTOMY (H): Primary | ICD-10-CM

## 2019-09-04 NOTE — TELEPHONE ENCOUNTER
Patient called regarding below results. She verbalized understanding. No other questions or concerns at this time.

## 2019-09-04 NOTE — TELEPHONE ENCOUNTER
"Patient is s/p transsphenoidal hypophysectomy on 11/6/18 with Dr. Pack. She obtained a follow up MRI on 08/28/18. Per Dr. Pack, \"looks good, small residual in cavernous sinus, expected finding.  Plan to repeat in 1 year.\"  Calling patient to relay MRI results, no answer and voicemail was full. Order for MRI in 1 year placed.   "

## 2019-12-12 ENCOUNTER — TELEPHONE (OUTPATIENT)
Dept: NEUROSURGERY | Facility: CLINIC | Age: 39
End: 2019-12-12

## 2019-12-12 DIAGNOSIS — E89.3 S/P TRANSSPHENOIDAL HYPOPHYSECTOMY (H): Primary | ICD-10-CM

## 2019-12-12 NOTE — TELEPHONE ENCOUNTER
"Patient called and LVM on nurse line. She is s/p transphenoidal hypophysectomy on 11/6/18 with Dr. Pack. States that for the past couple months she has been waking up in the middle of the night with a clear \"water like\" fluid draining out of her left nostril. She says that she notices it more when she gets up in the morning but it does occasionally happen throughout the day as well. States it is pretty consistent. Informed her that I will route message to Dr. Pack to see what recommendations he has for this.     "

## 2019-12-12 NOTE — TELEPHONE ENCOUNTER
Per Dr. Pack, we can get an updated MRI and he would like to see her in clinic. MRI ordered. Transferred to scheduling to make appt. No further questions or concerns.

## 2019-12-16 ENCOUNTER — HOSPITAL ENCOUNTER (OUTPATIENT)
Dept: MRI IMAGING | Facility: CLINIC | Age: 39
Discharge: HOME | End: 2019-12-16
Attending: NEUROLOGICAL SURGERY | Admitting: NEUROLOGICAL SURGERY
Payer: MEDICAID

## 2019-12-16 ENCOUNTER — OFFICE VISIT (OUTPATIENT)
Dept: NEUROSURGERY | Facility: CLINIC | Age: 39
End: 2019-12-16
Attending: NEUROLOGICAL SURGERY
Payer: MEDICAID

## 2019-12-16 VITALS
HEART RATE: 116 BPM | SYSTOLIC BLOOD PRESSURE: 124 MMHG | BODY MASS INDEX: 35.36 KG/M2 | WEIGHT: 220 LBS | OXYGEN SATURATION: 100 % | DIASTOLIC BLOOD PRESSURE: 86 MMHG | HEIGHT: 66 IN | TEMPERATURE: 97.5 F

## 2019-12-16 DIAGNOSIS — J32.1 CHRONIC FRONTAL SINUSITIS: ICD-10-CM

## 2019-12-16 DIAGNOSIS — E89.3 S/P TRANSSPHENOIDAL HYPOPHYSECTOMY (H): ICD-10-CM

## 2019-12-16 DIAGNOSIS — E89.3 S/P TRANSSPHENOIDAL HYPOPHYSECTOMY (H): Primary | ICD-10-CM

## 2019-12-16 PROCEDURE — 31231 NASAL ENDOSCOPY DX: CPT | Performed by: NEUROLOGICAL SURGERY

## 2019-12-16 PROCEDURE — 25500064 ZZH RX 255 OP 636: Performed by: NEUROLOGICAL SURGERY

## 2019-12-16 PROCEDURE — A9585 GADOBUTROL INJECTION: HCPCS | Performed by: NEUROLOGICAL SURGERY

## 2019-12-16 PROCEDURE — 70553 MRI BRAIN STEM W/O & W/DYE: CPT

## 2019-12-16 PROCEDURE — G0463 HOSPITAL OUTPT CLINIC VISIT: HCPCS | Mod: 25

## 2019-12-16 PROCEDURE — 99213 OFFICE O/P EST LOW 20 MIN: CPT | Mod: 25 | Performed by: NEUROLOGICAL SURGERY

## 2019-12-16 RX ORDER — GADOBUTROL 604.72 MG/ML
0.1 INJECTION INTRAVENOUS ONCE
Status: COMPLETED | OUTPATIENT
Start: 2019-12-16 | End: 2019-12-16

## 2019-12-16 RX ADMIN — GADOBUTROL 10 ML: 604.72 INJECTION INTRAVENOUS at 07:25

## 2019-12-16 ASSESSMENT — PAIN SCALES - GENERAL: PAINLEVEL: MILD PAIN (2)

## 2019-12-16 ASSESSMENT — MIFFLIN-ST. JEOR: SCORE: 1699.66

## 2019-12-16 NOTE — PROGRESS NOTES
1 yr post-op from TSH.  In the last 3 months, having increasing, episodic, 7/10 left frontal headaches.  ~ 1-2x per week, has drainage of clear fluid from the left nostril.  Not reproducible or positional in nature.  A new MRI was obtained, which showed left frontal sinusitis, and stable small right cavernous residual adenoma.  There was no evidence of pneumocephalus.    I performed diagnostic nasal endoscopy in clinic today, and noted well healing mucosa in the sphenoid region.       Past Medical History:   Diagnosis Date     ADHD      Anemia      Anxiety      B12 deficiency      Blurred vision      Depression      HA (headache)      Hypothyroidism      Infertility, female      Obesity      Pituitary macroadenoma (H)      Thyroid disease      Varicella      Vitiligo      Past Surgical History:   Procedure Laterality Date     C C-SEC ONLY,PREV C-SEC       OPTICAL TRACKING SYSTEM ENDOSCOPIC ENDONASAL SURGERY N/A 11/6/2018    Procedure: ENDOSCOPE ENDONASAL RESECTION OF SUPRACELLAR MASS;  Surgeon: Mervin Pack MD;  Location:  OR     Social History     Socioeconomic History     Marital status: Single     Spouse name: Not on file     Number of children: 1     Years of education: Not on file     Highest education level: Not on file   Occupational History     Occupation: homemaker     Employer: NONE    Social Needs     Financial resource strain: Not on file     Food insecurity:     Worry: Not on file     Inability: Not on file     Transportation needs:     Medical: Not on file     Non-medical: Not on file   Tobacco Use     Smoking status: Never Smoker     Smokeless tobacco: Never Used   Substance and Sexual Activity     Alcohol use: No     Drug use: No     Sexual activity: Yes     Partners: Male   Lifestyle     Physical activity:     Days per week: Not on file     Minutes per session: Not on file     Stress: Not on file   Relationships     Social connections:     Talks on phone: Not on file     Gets together: Not  "on file     Attends Mandaeism service: Not on file     Active member of club or organization: Not on file     Attends meetings of clubs or organizations: Not on file     Relationship status: Not on file     Intimate partner violence:     Fear of current or ex partner: Not on file     Emotionally abused: Not on file     Physically abused: Not on file     Forced sexual activity: Not on file   Other Topics Concern     Parent/sibling w/ CABG, MI or angioplasty before 65F 55M? Not Asked      Service Not Asked     Blood Transfusions Not Asked     Caffeine Concern Not Asked     Occupational Exposure Not Asked     Hobby Hazards Not Asked     Sleep Concern Not Asked     Stress Concern Yes     Weight Concern Not Asked     Special Diet Not Asked     Back Care Not Asked     Exercise No     Bike Helmet Not Asked     Seat Belt Yes     Self-Exams Not Asked   Social History Narrative     Not on file     Family History   Problem Relation Age of Onset     C.A.D. No family hx of      Diabetes No family hx of      Breast Cancer No family hx of      Cancer - colorectal No family hx of      Prostate Cancer No family hx of         ROS: 10 point ROS neg other than the symptoms noted above in the HPI.    Physical Exam  /86 (BP Location: Right arm, Patient Position: Sitting, Cuff Size: Adult Large)   Pulse 116   Temp 97.5  F (36.4  C) (Oral)   Ht 1.676 m (5' 6\")   Wt 99.8 kg (220 lb)   SpO2 100%   BMI 35.51 kg/m    HEENT:  Normocephalic, atraumatic.  PERRLA.  EOM s intact.  Visual fields full to gross exam  Neck:  Supple, non-tender, without lymphadenopathy.  Heart:  No peripheral edema  Lungs:  No SOB  Abdomen:  Non-distended.   Skin:  Warm and dry.  Extremities:  No edema, cyanosis or clubbing.  Psychiatric:  No apparent distress  Musculoskeletal:  Normal bulk and tone    NEUROLOGICAL EXAMINATION:     Mental status:  Alert and Oriented x 3, speech is fluent.  Cranial nerves:  II-XII intact.   Motor:    Shoulder " Abduction:  Right:  5/5   Left:  5/5  Biceps:                      Right:  5/5   Left:  5/5  Triceps:                     Right:  5/5   Left:  5/5  Wrist Extensors:       Right:  5/5   Left:  5/5  Wrist Flexors:           Right:  5/5   Left:  5/5  interosseus :            Right:  5/5   Left:  5/5  Hip Flexor:                Right: 5/5  Left:  5/5  Quadriceps:             Right:  5/5  Left:  5/5  Hamstrings:             Right:  5/5  Left:  5/5  Gastroc Soleus:        Right:  5/5  Left:  5/5  Tib/Ant:                      Right:  5/5  Left:  5/5  EHL:                     Right:  5/5  Left:  5/5  Sensation:  Intact  Reflexes:  Negative Babinski.  Negative Clonus.  Negative Jorgensen's.  Coordination:  Smooth finger to nose testing.   Negative pronator drift.  Smooth tandem walking.  Incision well healed    A/P:  Based on MRI findings of left frontal sinusitis, and lack of pneumocephalus, with well healing mucosa on endoscopy, I favor that the drainage is more likely related to sinusitis than CSF leak at this point  Will refer to Dr. Morales for evaluation  Plan to repeat MR Brain in 1 year for tumor follow up

## 2019-12-16 NOTE — LETTER
12/16/2019         RE: Catarino James  [address]  [place] [facility] [address]        Dear Colleague,    Thank you for referring your patient, Catarino James, to the [facility]. Please see a copy of my visit note below.    1 yr post-op from [facility].  In the last 3 months, having increasing, episodic, 7/10 left frontal headaches.  ~ 1-2x per week, has drainage of clear fluid from the left nostril.  Not reproducible or positional in nature.  A new MRI was obtained, which showed left frontal sinusitis, and stable small right cavernous residual adenoma.  There was no evidence of pneumocephalus.    I performed diagnostic nasal endoscopy in clinic today, and noted well healing mucosa in the sphenoid region.       Past Medical History:   Diagnosis Date     ADHD      Anemia      Anxiety      B12 deficiency      Blurred vision      Depression      HA (headache)      Hypothyroidism      Infertility, female      Obesity      Pituitary macroadenoma (H)      Thyroid disease      Varicella      Vitiligo      Past Surgical History:   Procedure Laterality Date     C C-SEC ONLY,PREV C-SEC       OPTICAL TRACKING SYSTEM ENDOSCOPIC ENDONASAL SURGERY N/A 11/6/2018    Procedure: ENDOSCOPE ENDONASAL RESECTION OF SUPRACELLAR MASS;  Surgeon: Mervin Pack MD;  Location:  OR     Social History     Socioeconomic History     Marital status: Single     Spouse name: Not on file     Number of children: 1     Years of education: Not on file     Highest education level: Not on file   Occupational History     Occupation: homemaker     Employer: NONE    Social Needs     Financial resource strain: Not on file     Food insecurity:     Worry: Not on file     Inability: Not on file     Transportation needs:     Medical: Not on file     Non-medical: Not on file   Tobacco Use     Smoking status: Never Smoker     Smokeless tobacco: Never Used   Substance and Sexual Activity     Alcohol use: No     Drug use: No     Sexual  "activity: Yes     Partners: Male   Lifestyle     Physical activity:     Days per week: Not on file     Minutes per session: Not on file     Stress: Not on file   Relationships     Social connections:     Talks on phone: Not on file     Gets together: Not on file     Attends [employer] service: Not on file     Active member of club or organization: Not on file     Attends meetings of clubs or organizations: Not on file     Relationship status: Not on file     Intimate partner violence:     Fear of current or ex partner: Not on file     Emotionally abused: Not on file     Physically abused: Not on file     Forced sexual activity: Not on file   Other Topics Concern     Parent/sibling w/ CABG, MI or angioplasty before 65F 55M? Not Asked      Service Not Asked     Blood Transfusions Not Asked     Caffeine Concern Not Asked     Occupational Exposure Not Asked     Hobby Hazards Not Asked     Sleep Concern Not Asked     Stress Concern Yes     Weight Concern Not Asked     Special Diet Not Asked     Back Care Not Asked     Exercise No     Bike Helmet Not Asked     Seat Belt Yes     Self-Exams Not Asked   Social History Narrative     Not on file     Family History   Problem Relation Age of Onset     C.A.D. No family hx of      Diabetes No family hx of      Breast Cancer No family hx of      Cancer - colorectal No family hx of      Prostate Cancer No family hx of         ROS: 10 point ROS neg other than the symptoms noted above in the HPI.    Physical Exam  /86 (BP Location: Right arm, Patient Position: Sitting, Cuff Size: Adult Large)   Pulse 116   Temp 97.5  F (36.4  C) (Oral)   Ht 1.676 m (5' 6\")   Wt 99.8 kg (220 lb)   SpO2 100%   BMI 35.51 kg/m     HEENT:  Normocephalic, atraumatic.  PERRLA.  EOM s intact.  Visual fields full to gross exam  Neck:  Supple, non-tender, without lymphadenopathy.  Heart:  No peripheral edema  Lungs:  No SOB  Abdomen:  Non-distended.   Skin:  Warm and dry.  Extremities:  No " edema, cyanosis or clubbing.  Psychiatric:  No apparent distress  Musculoskeletal:  Normal bulk and tone    NEUROLOGICAL EXAMINATION:     Mental status:  Alert and Oriented x 3, speech is fluent.  Cranial nerves:  II-XII intact.   Motor:    Shoulder Abduction:  Right:  5/5   Left:  5/5  Biceps:                      Right:  5/5   Left:  5/5  Triceps:                     Right:  5/5   Left:  5/5  Wrist Extensors:       Right:  5/5   Left:  5/5  Wrist Flexors:           Right:  5/5   Left:  5/5  interosseus :            Right:  5/5   Left:  5/5  Hip Flexor:                Right: 5/5  Left:  5/5  Quadriceps:             Right:  5/5  Left:  5/5  Hamstrings:             Right:  5/5  Left:  5/5  Gastroc Soleus:        Right:  5/5  Left:  5/5  Tib/Ant:                      Right:  5/5  Left:  5/5  EHL:                     Right:  5/5  Left:  5/5  Sensation:  Intact  Reflexes:  Negative Babinski.  Negative Clonus.  Negative Jorgensen's.  Coordination:  Smooth finger to nose testing.   Negative pronator drift.  Smooth tandem walking.  Incision well healed    A/P:  Based on MRI findings of left frontal sinusitis, and lack of pneumocephalus, with well healing mucosa on endoscopy, I favor that the drainage is more likely related to sinusitis than CSF leak at this point  Will refer to Dr. Morales for evaluation  Plan to repeat MR Brain in 1 year for tumor follow up      Again, thank you for allowing me to participate in the care of your patient.        Sincerely,        Mervin Pack MD

## 2019-12-16 NOTE — NURSING NOTE
"Catarino James is a 39 year old female who presents for:  Chief Complaint   Patient presents with     Follow Up     Follow up s/p 11/5/18 pituitary tumor removal for new symptoms.         Initial Vitals:  /86 (BP Location: Right arm, Patient Position: Sitting, Cuff Size: Adult Large)   Pulse 116   Temp 97.5  F (36.4  C) (Oral)   Ht 5' 6\" (1.676 m)   Wt 220 lb (99.8 kg)   SpO2 100%   BMI 35.51 kg/m   Estimated body mass index is 35.51 kg/m  as calculated from the following:    Height as of this encounter: 5' 6\" (1.676 m).    Weight as of this encounter: 220 lb (99.8 kg).. Body surface area is 2.16 meters squared. BP completed using cuff size: large  Mild Pain (2)        Nursing Comments: Patient states that she is having slight headaches along with drainage from the left nostril.         Beth Angulo, SADIA    "

## 2019-12-20 ENCOUNTER — APPOINTMENT (OUTPATIENT)
Dept: GENERAL RADIOLOGY | Facility: CLINIC | Age: 39
End: 2019-12-20
Attending: EMERGENCY MEDICINE
Payer: MEDICAID

## 2019-12-20 ENCOUNTER — HOSPITAL ENCOUNTER (EMERGENCY)
Facility: CLINIC | Age: 39
Discharge: HOME | End: 2019-12-20
Attending: EMERGENCY MEDICINE | Admitting: EMERGENCY MEDICINE
Payer: MEDICAID

## 2019-12-20 VITALS
OXYGEN SATURATION: 98 % | TEMPERATURE: 97.9 F | SYSTOLIC BLOOD PRESSURE: 143 MMHG | BODY MASS INDEX: 35.36 KG/M2 | HEIGHT: 66 IN | DIASTOLIC BLOOD PRESSURE: 97 MMHG | WEIGHT: 220 LBS

## 2019-12-20 DIAGNOSIS — S82.891A ANKLE FRACTURE, RIGHT, CLOSED, INITIAL ENCOUNTER: ICD-10-CM

## 2019-12-20 PROCEDURE — 27786 TREATMENT OF ANKLE FRACTURE: CPT | Mod: RT

## 2019-12-20 PROCEDURE — 99284 EMERGENCY DEPT VISIT MOD MDM: CPT | Mod: 25

## 2019-12-20 PROCEDURE — 40000986 XR ANKLE RT G/E 3 VW: Mod: RT

## 2019-12-20 PROCEDURE — 25000132 ZZH RX MED GY IP 250 OP 250 PS 637: Performed by: EMERGENCY MEDICINE

## 2019-12-20 PROCEDURE — 73610 X-RAY EXAM OF ANKLE: CPT | Mod: RT

## 2019-12-20 RX ORDER — OXYCODONE HYDROCHLORIDE 5 MG/1
5 TABLET ORAL EVERY 6 HOURS PRN
Qty: 12 TABLET | Refills: 0 | Status: SHIPPED | OUTPATIENT
Start: 2019-12-20

## 2019-12-20 RX ORDER — IBUPROFEN 200 MG
600 TABLET ORAL EVERY 8 HOURS PRN
Qty: 1 TABLET | Refills: 0 | Status: SHIPPED | OUTPATIENT
Start: 2019-12-20

## 2019-12-20 RX ORDER — OXYCODONE HYDROCHLORIDE 5 MG/1
10 TABLET ORAL ONCE
Status: COMPLETED | OUTPATIENT
Start: 2019-12-20 | End: 2019-12-20

## 2019-12-20 RX ORDER — ACETAMINOPHEN 500 MG
500-1000 TABLET ORAL EVERY 8 HOURS PRN
Qty: 1 TABLET | Refills: 0 | Status: SHIPPED | OUTPATIENT
Start: 2019-12-20 | End: 2019-12-30

## 2019-12-20 RX ORDER — ACETAMINOPHEN 500 MG
1000 TABLET ORAL ONCE
Status: COMPLETED | OUTPATIENT
Start: 2019-12-20 | End: 2019-12-20

## 2019-12-20 RX ADMIN — ACETAMINOPHEN 1000 MG: 500 TABLET, FILM COATED ORAL at 07:57

## 2019-12-20 RX ADMIN — OXYCODONE HYDROCHLORIDE 10 MG: 5 TABLET ORAL at 07:58

## 2019-12-20 ASSESSMENT — ENCOUNTER SYMPTOMS: JOINT SWELLING: 1

## 2019-12-20 ASSESSMENT — MIFFLIN-ST. JEOR: SCORE: 1699.66

## 2019-12-20 NOTE — ED AVS SNAPSHOT
[facility] Emergency Department  [address]  [facility] [place] [address]  Phone:  [phone]  Fax:  [phone]                                    Catarino James   MRN: [id number]    Department:  [facility] Emergency Department   Date of Visit:  12/20/2019           After Visit Summary Signature Page    I have received my discharge instructions, and my questions have been answered. I have discussed any challenges I see with this plan with the nurse or doctor.    ..........................................................................................................................................  Patient/Patient Representative Signature      ..........................................................................................................................................  Patient Representative Print Name and Relationship to Patient    ..................................................               ................................................  Date                                   Time    ..........................................................................................................................................  Reviewed by Signature/Title    ...................................................              ..............................................  Date                                               Time          22EPIC Rev 08/18

## 2019-12-20 NOTE — DISCHARGE INSTRUCTIONS
Discharge Instructions  Extremity Injury    You were seen today for an injury to an extremity (arm, hand, leg, or foot). You may have a bruise, strain, or fracture (broken bone).    Generally, every Emergency Department visit should have a follow-up clinic visit with either a primary or a specialty clinic/provider. Please follow-up as instructed by your emergency provider today.  Return to the Emergency Department right away if:  Your pain seems to change or get worse or there is pain in a new area that wasn t evaluated today.  Your extremity becomes pale, cool, blue, or numb or tingling past the injury.  You have more drainage, redness or pain in the area of the cut or abrasion.  You have pain that you cannot control with the medicine recommended or prescribed here, or you have pain that seems too much for your injury.  Your child (who is injured) will not stop crying or is much more fussy than normal.  You have new symptoms or anything that worries you.    What to Expect:  Your swelling and pain may be worse the day after your injury, but should not be severe and should start getting better after that. You should not have new symptoms and your pain should not get worse.  You may start to get a bruise over the injured area or below the injured area (bruising can follow gravity).  Your movement and strength should get better with time.  Some injuries may not show up until after you have left the Emergency Department so it is important to follow-up as directed.  Your injury may prevent you from working.  Follow-up with your regular provider to get a work release note.  Pain medications or your injury may make it unsafe to drive or operate machinery.    Home Care:  RICE: Rest, Ice, Compression, Elevation  Rest: Rest your injured area for at least 1-2 days. After that you may start using your extremity again as long as there is not too much pain.   Ice: Apply ice your injured area for 15 minutes at a time, at least 3  times a day. Use a cloth between the ice bag and your skin to prevent frostbite. Do not sleep with an ice pack or heating pad on, since this can cause burns or skin injury.  Compression: You may use an elastic bandage (Ace  Wrap) if it makes you more comfortable. Wrap it just tight enough to provide light compression, like a new pair of socks feels. Loosen the bandage if you have swelling past the bandage.  Elevation: Raise the injured area above the level of your heart as much as possible in the first 1-2 days.    Use Tylenol  (acetaminophen), Motrin (ibuprofen), or Advil  (ibuprofen) for your pain unless you have an allergy or are told not to use these medications by your provider.  Take the medications as instructed on the package. Tylenol  (acetaminophen) is in many prescription medicines and non-prescription medicines--check all of your medicines to be sure you aren t taking more than 3000 mg per day.  Please follow any other instructions that were discussed with you by your provider.    Stretching/Exercises:  You may have been provided with instructions for stretching or exercises. If your injury was to your arm or shoulder and your provider put you in a sling or an immobilizer, it is important that you take off your immobilizer within 3 days and stretch/move your shoulder, unless your provider specifically tells you to not move your shoulder.  This is to prevent further injury such as a  frozen shoulder .     If you were given a prescription for medicine here today, be sure to read all of the information (including the package insert) that comes with your prescription.  This will include important information about the medicine, its side effects, and any warnings that you need to know about.  The pharmacist who fills the prescription can provide more information and answer questions you may have about the medicine.  If you have questions or concerns that the pharmacist cannot address, please call or return to  the Emergency Department.     Remember that you can always come back to the Emergency Department if you are not able to see your regular provider in the amount of time listed above, if you get any new symptoms, or if there is anything that worries you.  Discharge Instructions  Splint Care    You had a splint put on today to help protect your injury and help it heal.  Splints are used to treat things like strains, sprains, large cuts, and fractures (broken bones). Splints are temporary and are designed to allow for swelling.    Be sure your splint is not too tight!  If you splint is too tight, it may cause loss of blood supply.  Signs of your splint being too tight include: your arm or leg hurting a lot more; your fingers or toes getting numb, cold, pale or blue; or your child is crying, fussing or seeming restless.    Generally, every Emergency Department visit should have a follow-up clinic visit with either a primary or a specialty clinic/provider. Please follow-up as instructed by your emergency provider today.  Return to the Emergency Department right away if:  You have increased pain or pressure around the injury.  You have numbness, tingling, or cool, pale, or blue toes or fingers past the injury.  Your child is more fussy than normal, crying a lot, or restless.  Your splint becomes soft, breaks, or is wet.  Your splint begins to smell bad.  Your splint is cutting into your skin.    Home care:  Keep the injured area above the level of your heart while laying or sitting down.  This will help decrease the swelling and the pain.  Keep the splint dry.  Do not put objects down or inside the splint.  If there is an elastic bandage (Ace  wrap) holding the splint on this may be loosened slightly to relieve pressure or pain.  If pain continues return to the Emergency Department right away.  Do not remove your splint by yourself unless told to by your provider.    Follow-up:  Sometimes the splint put on in the Emergency  Department needs to be changed once the swelling has gone down and a more permanent cast needs to be placed.  This is usually done by a bone specialist provider (Orthopedist).  Follow the instructions given to you by your provider today.    If you were given a prescription for medicine here today, be sure to read all of the information (including the package insert) that comes with your prescription.  This will include important information about the medicine, its side effects, and any warnings that you need to know about.  The pharmacist who fills the prescription can provide more information and answer questions you may have about the medicine.  If you have questions or concerns that the pharmacist cannot address, please call or return to the Emergency Department.     Remember that you can always come back to the Emergency Department if you are not able to see your regular provider in the amount of time listed above, if you get any new symptoms, or if there is anything that worries you.  Opioid Medication Discharge Instructions    You have been given a prescription for an opioid (narcotic) pain medicine and/or have   received a pain medicine while here in the emergency department. These medicines can make you drowsy or impaired.     You must not drive, operate dangerous equipment, or   engage in any other dangerous activities while taking these medications. If you drive while taking these medications, you could be arrested for DUI, or driving under the   influence. Do not drink any alcohol while you are taking these medications.     Opioid pain medications can cause addiction. If you have a history of chemical   dependency of any type, you are at a higher risk of becoming addicted to pain   medications. Only take these prescribed medications to treat your pain when all other   options have been tried. Take it for as short a time and as few doses as possible.     Store your pain pills in a secure place, as they are  frequently stolen and provide a dangerous opportunity for children or visitors in your house to start abusing these powerful medications. We will not replace any lost or stolen medicine.     As soon as your pain is better, you should safely dispose of all your remaining medication.     Many prescription pain medications contain Tylenol (acetaminophen), including Vicodin, Tylenol #3, Norco, Lortab, and Percocet. You should not take any extra pills of Tylenol if you are using these prescription medications or you can get very sick. Do not ever take more than 4000 mg of acetaminophen in any 24 hour period.    All opioids tend to cause constipation. Drink plenty of water and eat foods that have   a lot of fiber, such as fruits, vegetables, prune juice, apple juice and high fiber cereal.   Take a laxative if you don t move your bowels at least every other day. Miralax, Milk of   Magnesia, Colace, or Senna can be used to keep you regular.

## 2019-12-20 NOTE — ED PROVIDER NOTES
"  History     Chief Complaint:  Ankle Pain      HPI   Catarino James is a 39 year old female who presents with ankle pain. The patient was scraping ice off her car at 0700 this morning when she slipped and fell. During the fall her right foot remained planted on the ground and she heard a \"pop\". She denies loss of consciousness in the fall. Currently, the patient endorses severe right ankle pain. She notes that she has only had three periods this month due to stress from losing her father.     Allergies:  No Known Allergies     Medications:    Adderall  Wellbutrin  Fluoxitine  Levothyroxine    Past Medical History:    Anemia  Anxiety   Blurred vision  Hypothyroidism  Infertility  Obesity   Vitiligo  Varicella    Past Surgical History:     section    Family History:    No past pertinent family history.    Social History:  Smoking Status: Never Smoker  Smokeless Tobacco: Never Used  Alcohol Use: No  Drug Use: No  PCP: Katalina Muñoz  Marital Status:  Single     Review of Systems   Musculoskeletal: Positive for joint swelling.        Positive right ankle pain     Neurological: Negative for light-headedness, numbness and headaches.   All other systems reviewed and are negative.        Physical Exam     Patient Vitals for the past 24 hrs:   BP Temp Temp src Heart Rate SpO2 Height Weight   19 0801 (!) 143/97 97.9  F (36.6  C) Oral 89 98 % 1.676 m (5' 6\") 99.8 kg (220 lb)       Physical Exam    Nursing note and vitals reviewed.    Constitutional: Pleasant and well groomed. Tearful.          HENT:    Mouth/Throat: Oropharynx is without swelling or erythema. Oral mucosa moist.    Eyes: Conjunctivae are normal. No scleral icterus.    Neck: Neck supple. Moving neck freely.  Cardiovascular: Normal rate, regular rhythm and intact distal pulses.    Pulmonary/Chest: Effort normal and breath sounds normal.   Abdominal: Soft.  No distension. There is no tenderness.   Musculoskeletal:  No edema, No calf tenderness. " Right Ankle-  capillary refill, pulses, and touch sensation intact. No navicular or 5th metatarsal tenderness. Mild right lateral malleolus swelling. Bilateral malleolar tenderness.   Neurological:Alert. Coordination normal.   Skin: Skin is warm and dry.   Psychiatric: Normal mood and anxious affect.     Emergency Department Course     Imaging:  Radiology findings were communicated with the patient who voiced understanding of the findings.    Ankle XR, G/E 3 views, right 0848  Mildly displaced oblique fracture of the right distal  fibula. There is overlying soft tissue swelling.   Reading per radiology    Ankle XR, G/E 3 views, right 1020  Oblique fracture distal fibula again identified. Fracture  line is slightly more apparent which may be due to minimal  displacement. There does appear to be early callus formation at the  fracture site.  Reading per radiology    Procedures     Right Short Leg Posterior Splint with Stirrup Placement     I fashioned a custom short leg posterior splint with stirrup Ortho-Glass after carefully padding with cotton batting.  After placement I checked and adjusted the fit to ensure proper positioning. Patient was more comfortable with splint in place. Sensation and circulation are intact after splint placement.    Interventions:  0757 Tylenol 1,000 mg oral   0758 Oxycodone 10 mg oral    Emergency Department Course:  Nursing notes and vitals reviewed.    0748 I performed an exam of the patient as documented above.     The patient was sent for a ankle XR 3 views while in the emergency department, results above.     0940 I returned to update the patient.     0958 The splint was placed as described above.    1028 I returned to update the patient about their plan for discharge.     Findings and plan explained to the Patient. Patient discharged home with instructions regarding supportive care, medications, and reasons to return. The importance of close follow-up was reviewed. The patient was  prescribed tylenol, ibuprofen and oxycodone.     Impression & Plan      Medical Decision Making:  Catarino James is a 39 year old female who presents to the emergency department today for evaluation of ankle pain after a slip and fall. She did not hit her head, pass out or lose consciousness and denies any other injuries but she was unable to bear weight. She arrived with swelling of the right ankle. Pain medicine was administered and X ray was obtained which showed a minimally displaced distal fibula fracture. She was placed in a splint as noted above with near anatomical alignment. She was then tolerating the pain well and was discharged home with orthopedic follow up to be non weight bearing. She received standard narcotic precautions and standard splint precautions.     Diagnosis:    ICD-10-CM    1. Ankle fracture, right, closed, initial encounter S82.891A        Disposition:   The patient is discharged to home.    Discharge Medications:  New Prescriptions    ACETAMINOPHEN (TYLENOL) 500 MG TABLET    Take 1-2 tablets (500-1,000 mg) by mouth every 8 hours as needed for mild pain (DO NOT FILL! For dosing only.) DO NOT FILL!   For Dosing Only    IBUPROFEN (ADVIL/MOTRIN) 200 MG TABLET    Take 3 tablets (600 mg) by mouth every 8 hours as needed for mild pain    OXYCODONE (ROXICODONE) 5 MG TABLET    Take 1 tablet (5 mg) by mouth every 6 hours as needed for pain       Scribe Disclosure:  Velia RUBI, am serving as a scribe at 7:56 AM on 12/20/2019 to document services personally performed by Cathryn Del Toro based on my observations and the provider's statements to me.     [facility] EMERGENCY DEPARTMENT       Cathryn Del Toro MD  12/23/19 1041

## 2019-12-23 ASSESSMENT — ENCOUNTER SYMPTOMS
LIGHT-HEADEDNESS: 0
NUMBNESS: 0
HEADACHES: 0

## 2021-08-17 ENCOUNTER — TELEPHONE (OUTPATIENT)
Dept: NEUROSURGERY | Facility: CLINIC | Age: 41
End: 2021-08-17

## (undated) DEVICE — ESU PENCIL W/HOLSTER E2350H

## (undated) DEVICE — SPONGE COTTONOID 1/2X3" 80-1407

## (undated) DEVICE — PIN SKULL MAYFIELD ADULT TITANIUM 3/PK A1120

## (undated) DEVICE — GLOVE PROTEXIS W/NEU-THERA 6.5  2D73TE65

## (undated) DEVICE — SURGICEL HEMOSTAT 2X14" 1951

## (undated) DEVICE — GLOVE PROTEXIS BLUE W/NEU-THERA 8.5  2D73EB85

## (undated) DEVICE — PREP SKIN SCRUB TRAY 4461A

## (undated) DEVICE — NDL 22GA 1" 305155

## (undated) DEVICE — RX SURGIFLO HEMOSTATIC MATRIX W/THROMBIN 8ML 2994

## (undated) DEVICE — SPONGE SURGIFOAM 50

## (undated) DEVICE — MARKER SPHERES PASSIVE MEDT PACK 5 8801075

## (undated) DEVICE — DRAPE MAYO STAND 23X54 8337

## (undated) DEVICE — ENDO INSERT ESU BIPOLAR FCP STORZ SHRP ARW TIP CLSG 28164FDG

## (undated) DEVICE — CATH TRAY FOLEY SURESTEP 16FR W/URNE MTR STLK LATEX A303316A

## (undated) DEVICE — SYR BULB IRRIG DOVER 60 ML LATEX FREE 67000

## (undated) DEVICE — LINEN TOWEL PACK X5 5464

## (undated) DEVICE — DRAPE POUCH INSTRUMENT 1018

## (undated) DEVICE — SOL WATER IRRIG 1000ML BOTTLE 2F7114

## (undated) DEVICE — Device

## (undated) DEVICE — ESU GROUND PAD E7506

## (undated) DEVICE — DRSG STERI STRIP 1/2X4" R1547

## (undated) DEVICE — GLOVE PROTEXIS W/NEU-THERA 8.5  2D73TE85

## (undated) DEVICE — SOL WATER IRRIG 1000ML BOTTLE 07139-09

## (undated) DEVICE — SYR 05ML LL W/O NDL

## (undated) DEVICE — DRAPE SHEET REV FOLD 3/4 9349

## (undated) DEVICE — SYR EAR BULB 3OZ 0035830

## (undated) DEVICE — DRAPE IOBAN INCISE 23X17" 6650EZ

## (undated) DEVICE — DRAPE ISOLATION BAG 1003

## (undated) DEVICE — ANTIFOG SOLUTION W/FOAM PAD 31142527

## (undated) DEVICE — GLOVE PROTEXIS W/NEU-THERA 8.0  2D73TE80

## (undated) DEVICE — GLOVE PROTEXIS W/NEU-THERA 7.5  2D73TE75

## (undated) DEVICE — GLOVE PROTEXIS BLUE W/NEU-THERA 7.0  2D73EB70

## (undated) DEVICE — TUBING SUCTION SOFT 20'X3/16" 0036570

## (undated) DEVICE — ENDO INSERT BLADE KNIFE MICRO STORZ SICKLE 28164MS/3

## (undated) DEVICE — PREP CHLORAPREP 26ML TINTED ORANGE  260815

## (undated) RX ORDER — DEXAMETHASONE SODIUM PHOSPHATE 4 MG/ML
INJECTION, SOLUTION INTRA-ARTICULAR; INTRALESIONAL; INTRAMUSCULAR; INTRAVENOUS; SOFT TISSUE
Status: DISPENSED
Start: 2018-11-06

## (undated) RX ORDER — OXYCODONE HCL 10 MG/1
TABLET, FILM COATED, EXTENDED RELEASE ORAL
Status: DISPENSED
Start: 2018-11-06

## (undated) RX ORDER — REMIFENTANIL HYDROCHLORIDE 1 MG/ML
INJECTION, POWDER, LYOPHILIZED, FOR SOLUTION INTRAVENOUS
Status: DISPENSED
Start: 2018-11-06

## (undated) RX ORDER — PROPOFOL 10 MG/ML
INJECTION, EMULSION INTRAVENOUS
Status: DISPENSED
Start: 2018-11-06

## (undated) RX ORDER — LIDOCAINE HYDROCHLORIDE AND EPINEPHRINE 10; 10 MG/ML; UG/ML
INJECTION, SOLUTION INFILTRATION; PERINEURAL
Status: DISPENSED
Start: 2018-11-06

## (undated) RX ORDER — GLYCOPYRROLATE 0.2 MG/ML
INJECTION, SOLUTION INTRAMUSCULAR; INTRAVENOUS
Status: DISPENSED
Start: 2018-11-06

## (undated) RX ORDER — FENTANYL CITRATE 50 UG/ML
INJECTION, SOLUTION INTRAMUSCULAR; INTRAVENOUS
Status: DISPENSED
Start: 2018-11-06

## (undated) RX ORDER — HYDROMORPHONE HYDROCHLORIDE 1 MG/ML
INJECTION, SOLUTION INTRAMUSCULAR; INTRAVENOUS; SUBCUTANEOUS
Status: DISPENSED
Start: 2018-11-06

## (undated) RX ORDER — CEFAZOLIN SODIUM 2 G/100ML
INJECTION, SOLUTION INTRAVENOUS
Status: DISPENSED
Start: 2018-11-06

## (undated) RX ORDER — VECURONIUM BROMIDE 1 MG/ML
INJECTION, POWDER, LYOPHILIZED, FOR SOLUTION INTRAVENOUS
Status: DISPENSED
Start: 2018-11-06

## (undated) RX ORDER — OXYMETAZOLINE HYDROCHLORIDE 0.05 G/100ML
SPRAY NASAL
Status: DISPENSED
Start: 2018-11-06

## (undated) RX ORDER — LIDOCAINE HYDROCHLORIDE 20 MG/ML
INJECTION, SOLUTION EPIDURAL; INFILTRATION; INTRACAUDAL; PERINEURAL
Status: DISPENSED
Start: 2018-11-06

## (undated) RX ORDER — GINSENG 100 MG
CAPSULE ORAL
Status: DISPENSED
Start: 2018-11-06

## (undated) RX ORDER — ONDANSETRON 2 MG/ML
INJECTION INTRAMUSCULAR; INTRAVENOUS
Status: DISPENSED
Start: 2018-11-06

## (undated) RX ORDER — CEFAZOLIN SODIUM 1 G/3ML
INJECTION, POWDER, FOR SOLUTION INTRAMUSCULAR; INTRAVENOUS
Status: DISPENSED
Start: 2018-11-06